# Patient Record
Sex: MALE | Race: WHITE | NOT HISPANIC OR LATINO | Employment: OTHER | URBAN - METROPOLITAN AREA
[De-identification: names, ages, dates, MRNs, and addresses within clinical notes are randomized per-mention and may not be internally consistent; named-entity substitution may affect disease eponyms.]

---

## 2017-01-10 ENCOUNTER — RECORDS - HEALTHEAST (OUTPATIENT)
Dept: LAB | Facility: CLINIC | Age: 82
End: 2017-01-10

## 2017-01-10 LAB
CHOLEST SERPL-MCNC: 192 MG/DL
FASTING STATUS PATIENT QL REPORTED: YES
HDLC SERPL-MCNC: 34 MG/DL
LDLC SERPL CALC-MCNC: 125 MG/DL
TRIGL SERPL-MCNC: 165 MG/DL

## 2018-02-02 ENCOUNTER — RECORDS - HEALTHEAST (OUTPATIENT)
Dept: LAB | Facility: CLINIC | Age: 83
End: 2018-02-02

## 2018-02-02 LAB
ALBUMIN SERPL-MCNC: 3.4 G/DL (ref 3.5–5)
ALP SERPL-CCNC: 53 U/L (ref 45–120)
ALT SERPL W P-5'-P-CCNC: 14 U/L (ref 0–45)
ANION GAP SERPL CALCULATED.3IONS-SCNC: 8 MMOL/L (ref 5–18)
AST SERPL W P-5'-P-CCNC: 21 U/L (ref 0–40)
BILIRUB SERPL-MCNC: 0.8 MG/DL (ref 0–1)
BUN SERPL-MCNC: 10 MG/DL (ref 8–28)
CALCIUM SERPL-MCNC: 8.7 MG/DL (ref 8.5–10.5)
CHLORIDE BLD-SCNC: 104 MMOL/L (ref 98–107)
CHOLEST SERPL-MCNC: 205 MG/DL
CO2 SERPL-SCNC: 25 MMOL/L (ref 22–31)
CREAT SERPL-MCNC: 0.79 MG/DL (ref 0.7–1.3)
FASTING STATUS PATIENT QL REPORTED: ABNORMAL
GFR SERPL CREATININE-BSD FRML MDRD: >60 ML/MIN/1.73M2
GLUCOSE BLD-MCNC: 82 MG/DL (ref 70–125)
HDLC SERPL-MCNC: 39 MG/DL
LDLC SERPL CALC-MCNC: 136 MG/DL
POTASSIUM BLD-SCNC: 4.5 MMOL/L (ref 3.5–5)
PROT SERPL-MCNC: 7 G/DL (ref 6–8)
SODIUM SERPL-SCNC: 137 MMOL/L (ref 136–145)
TRIGL SERPL-MCNC: 148 MG/DL
TSH SERPL DL<=0.005 MIU/L-ACNC: 3.15 UIU/ML (ref 0.3–5)

## 2018-02-21 ENCOUNTER — RECORDS - HEALTHEAST (OUTPATIENT)
Dept: LAB | Facility: CLINIC | Age: 83
End: 2018-02-21

## 2018-02-24 LAB
BACTERIA SPEC CULT: ABNORMAL
BACTERIA SPEC CULT: ABNORMAL

## 2018-12-27 ENCOUNTER — AMBULATORY - HEALTHEAST (OUTPATIENT)
Dept: ADMINISTRATIVE | Facility: REHABILITATION | Age: 83
End: 2018-12-27

## 2018-12-27 ENCOUNTER — RECORDS - HEALTHEAST (OUTPATIENT)
Dept: ADMINISTRATIVE | Facility: OTHER | Age: 83
End: 2018-12-27

## 2018-12-27 DIAGNOSIS — R41.3 MEMORY LOSS: ICD-10-CM

## 2019-01-02 ENCOUNTER — RECORDS - HEALTHEAST (OUTPATIENT)
Dept: LAB | Facility: HOSPITAL | Age: 84
End: 2019-01-02

## 2019-01-02 LAB
TSH SERPL DL<=0.005 MIU/L-ACNC: 2.19 UIU/ML (ref 0.3–5)
VIT B12 SERPL-MCNC: 729 PG/ML (ref 213–816)

## 2019-01-03 ENCOUNTER — HOSPITAL ENCOUNTER (OUTPATIENT)
Dept: MRI IMAGING | Facility: HOSPITAL | Age: 84
Discharge: HOME | End: 2019-01-03

## 2019-01-03 DIAGNOSIS — R41.3 MEMORY LOSS: ICD-10-CM

## 2019-01-03 LAB
CREAT BLD-MCNC: 0.9 MG/DL
CREAT BLD-MCNC: 0.9 MG/DL (ref 0.7–1.3)
POC GFR AMER AF HE - HISTORICAL: >60 ML/MIN/1.73M2
POC GFR NON AMER AF HE - HISTORICAL: >60 ML/MIN/1.73M2

## 2019-02-01 ENCOUNTER — RECORDS - HEALTHEAST (OUTPATIENT)
Dept: LAB | Facility: CLINIC | Age: 84
End: 2019-02-01

## 2019-02-01 LAB
ALBUMIN SERPL-MCNC: 3.5 G/DL (ref 3.5–5)
ALP SERPL-CCNC: 52 U/L (ref 45–120)
ALT SERPL W P-5'-P-CCNC: 15 U/L (ref 0–45)
ANION GAP SERPL CALCULATED.3IONS-SCNC: 11 MMOL/L (ref 5–18)
AST SERPL W P-5'-P-CCNC: 19 U/L (ref 0–40)
BILIRUB SERPL-MCNC: 0.8 MG/DL (ref 0–1)
BUN SERPL-MCNC: 9 MG/DL (ref 8–28)
CALCIUM SERPL-MCNC: 8.9 MG/DL (ref 8.5–10.5)
CHLORIDE BLD-SCNC: 103 MMOL/L (ref 98–107)
CHOLEST SERPL-MCNC: 203 MG/DL
CO2 SERPL-SCNC: 24 MMOL/L (ref 22–31)
CREAT SERPL-MCNC: 0.88 MG/DL (ref 0.7–1.3)
FASTING STATUS PATIENT QL REPORTED: YES
GFR SERPL CREATININE-BSD FRML MDRD: >60 ML/MIN/1.73M2
GLUCOSE BLD-MCNC: 83 MG/DL (ref 70–125)
HDLC SERPL-MCNC: 46 MG/DL
LDLC SERPL CALC-MCNC: 136 MG/DL
POTASSIUM BLD-SCNC: 4.1 MMOL/L (ref 3.5–5)
PROT SERPL-MCNC: 6.8 G/DL (ref 6–8)
SODIUM SERPL-SCNC: 138 MMOL/L (ref 136–145)
TRIGL SERPL-MCNC: 106 MG/DL
TSH SERPL DL<=0.005 MIU/L-ACNC: 2.72 UIU/ML (ref 0.3–5)

## 2019-04-02 ENCOUNTER — RECORDS - HEALTHEAST (OUTPATIENT)
Dept: LAB | Facility: CLINIC | Age: 84
End: 2019-04-02

## 2019-04-02 LAB
BASOPHILS # BLD AUTO: 0 THOU/UL (ref 0–0.2)
BASOPHILS NFR BLD AUTO: 1 % (ref 0–2)
CRP SERPL HS-MCNC: 8.3 MG/L (ref 0–3)
EOSINOPHIL # BLD AUTO: 0.3 THOU/UL (ref 0–0.4)
EOSINOPHIL NFR BLD AUTO: 5 % (ref 0–6)
ERYTHROCYTE [DISTWIDTH] IN BLOOD BY AUTOMATED COUNT: 13.7 % (ref 11–14.5)
ERYTHROCYTE [SEDIMENTATION RATE] IN BLOOD BY WESTERGREN METHOD: 16 MM/HR (ref 0–15)
HCT VFR BLD AUTO: 44.1 % (ref 40–54)
HGB BLD-MCNC: 14.5 G/DL (ref 14–18)
LYMPHOCYTES # BLD AUTO: 1.8 THOU/UL (ref 0.8–4.4)
LYMPHOCYTES NFR BLD AUTO: 26 % (ref 20–40)
MCH RBC QN AUTO: 30.5 PG (ref 27–34)
MCHC RBC AUTO-ENTMCNC: 32.9 G/DL (ref 32–36)
MCV RBC AUTO: 93 FL (ref 80–100)
MONOCYTES # BLD AUTO: 0.9 THOU/UL (ref 0–0.9)
MONOCYTES NFR BLD AUTO: 13 % (ref 2–10)
NEUTROPHILS # BLD AUTO: 3.9 THOU/UL (ref 2–7.7)
NEUTROPHILS NFR BLD AUTO: 56 % (ref 50–70)
PLATELET # BLD AUTO: 278 THOU/UL (ref 140–440)
PMV BLD AUTO: 9 FL (ref 8.5–12.5)
RBC # BLD AUTO: 4.76 MILL/UL (ref 4.4–6.2)
WBC: 7 THOU/UL (ref 4–11)

## 2019-08-15 ENCOUNTER — RECORDS - HEALTHEAST (OUTPATIENT)
Dept: LAB | Facility: CLINIC | Age: 84
End: 2019-08-15

## 2019-08-15 LAB
O+P STL MICRO: NORMAL
SHIGA TOXIN 1: NEGATIVE
SHIGA TOXIN 2: NEGATIVE

## 2019-08-17 LAB — BACTERIA SPEC CULT: NORMAL

## 2019-10-18 ENCOUNTER — OFFICE VISIT - HEALTHEAST (OUTPATIENT)
Dept: GERIATRICS | Facility: CLINIC | Age: 84
End: 2019-10-18

## 2019-10-18 DIAGNOSIS — I10 HYPERTENSION, UNSPECIFIED TYPE: ICD-10-CM

## 2019-10-18 DIAGNOSIS — M35.3 PMR (POLYMYALGIA RHEUMATICA) (H): ICD-10-CM

## 2019-10-18 DIAGNOSIS — J94.2 HEMOTHORAX: ICD-10-CM

## 2019-10-18 DIAGNOSIS — S22.41XD CLOSED FRACTURE OF MULTIPLE RIBS OF RIGHT SIDE WITH ROUTINE HEALING, SUBSEQUENT ENCOUNTER: ICD-10-CM

## 2019-10-21 ENCOUNTER — RECORDS - HEALTHEAST (OUTPATIENT)
Dept: LAB | Facility: CLINIC | Age: 84
End: 2019-10-21

## 2019-10-21 LAB
BASOPHILS # BLD AUTO: 0.1 THOU/UL (ref 0–0.2)
BASOPHILS NFR BLD AUTO: 1 % (ref 0–2)
EOSINOPHIL # BLD AUTO: 0.5 THOU/UL (ref 0–0.4)
EOSINOPHIL NFR BLD AUTO: 5 % (ref 0–6)
ERYTHROCYTE [DISTWIDTH] IN BLOOD BY AUTOMATED COUNT: 14.6 % (ref 11–14.5)
HCT VFR BLD AUTO: 37.6 % (ref 40–54)
HGB BLD-MCNC: 12.2 G/DL (ref 14–18)
LYMPHOCYTES # BLD AUTO: 2.1 THOU/UL (ref 0.8–4.4)
LYMPHOCYTES NFR BLD AUTO: 22 % (ref 20–40)
MCH RBC QN AUTO: 30.3 PG (ref 27–34)
MCHC RBC AUTO-ENTMCNC: 32.4 G/DL (ref 32–36)
MCV RBC AUTO: 94 FL (ref 80–100)
MONOCYTES # BLD AUTO: 0.9 THOU/UL (ref 0–0.9)
MONOCYTES NFR BLD AUTO: 9 % (ref 2–10)
NEUTROPHILS # BLD AUTO: 6 THOU/UL (ref 2–7.7)
NEUTROPHILS NFR BLD AUTO: 62 % (ref 50–70)
PLATELET # BLD AUTO: 331 THOU/UL (ref 140–440)
PMV BLD AUTO: 9.5 FL (ref 8.5–12.5)
RBC # BLD AUTO: 4.02 MILL/UL (ref 4.4–6.2)
WBC: 9.6 THOU/UL (ref 4–11)

## 2019-10-22 ENCOUNTER — OFFICE VISIT - HEALTHEAST (OUTPATIENT)
Dept: GERIATRICS | Facility: CLINIC | Age: 84
End: 2019-10-22

## 2019-10-22 DIAGNOSIS — F03.90 DEMENTIA WITHOUT BEHAVIORAL DISTURBANCE, UNSPECIFIED DEMENTIA TYPE: ICD-10-CM

## 2019-10-22 DIAGNOSIS — M35.3 PMR (POLYMYALGIA RHEUMATICA) (H): ICD-10-CM

## 2019-10-22 DIAGNOSIS — J94.2 HEMOTHORAX: ICD-10-CM

## 2019-10-22 DIAGNOSIS — R78.81 BACTEREMIA DUE TO ENTEROCOCCUS: ICD-10-CM

## 2019-10-22 DIAGNOSIS — S22.41XD CLOSED FRACTURE OF MULTIPLE RIBS OF RIGHT SIDE WITH ROUTINE HEALING, SUBSEQUENT ENCOUNTER: ICD-10-CM

## 2019-10-22 DIAGNOSIS — I10 HYPERTENSION, UNSPECIFIED TYPE: ICD-10-CM

## 2019-10-22 DIAGNOSIS — B95.2 BACTEREMIA DUE TO ENTEROCOCCUS: ICD-10-CM

## 2019-10-22 DIAGNOSIS — I10 ESSENTIAL HYPERTENSION: ICD-10-CM

## 2019-10-23 ENCOUNTER — AMBULATORY - HEALTHEAST (OUTPATIENT)
Dept: OTHER | Facility: CLINIC | Age: 84
End: 2019-10-23

## 2019-10-23 ENCOUNTER — RECORDS - HEALTHEAST (OUTPATIENT)
Dept: LAB | Facility: CLINIC | Age: 84
End: 2019-10-23

## 2019-10-23 LAB
ANION GAP SERPL CALCULATED.3IONS-SCNC: 7 MMOL/L (ref 5–18)
BUN SERPL-MCNC: 15 MG/DL (ref 8–28)
CALCIUM SERPL-MCNC: 8.9 MG/DL (ref 8.5–10.5)
CHLORIDE BLD-SCNC: 101 MMOL/L (ref 98–107)
CO2 SERPL-SCNC: 27 MMOL/L (ref 22–31)
CREAT SERPL-MCNC: 0.84 MG/DL (ref 0.7–1.3)
ERYTHROCYTE [DISTWIDTH] IN BLOOD BY AUTOMATED COUNT: 14.6 % (ref 11–14.5)
GFR SERPL CREATININE-BSD FRML MDRD: >60 ML/MIN/1.73M2
GLUCOSE BLD-MCNC: 76 MG/DL (ref 70–125)
HCT VFR BLD AUTO: 40.8 % (ref 40–54)
HGB BLD-MCNC: 13.1 G/DL (ref 14–18)
MAGNESIUM SERPL-MCNC: 2.1 MG/DL (ref 1.8–2.6)
MCH RBC QN AUTO: 30.5 PG (ref 27–34)
MCHC RBC AUTO-ENTMCNC: 32.1 G/DL (ref 32–36)
MCV RBC AUTO: 95 FL (ref 80–100)
PLATELET # BLD AUTO: 332 THOU/UL (ref 140–440)
PMV BLD AUTO: 9.6 FL (ref 8.5–12.5)
POTASSIUM BLD-SCNC: 4.2 MMOL/L (ref 3.5–5)
PREALB SERPL-MCNC: 24.9 MG/DL (ref 19–38)
RBC # BLD AUTO: 4.29 MILL/UL (ref 4.4–6.2)
SODIUM SERPL-SCNC: 135 MMOL/L (ref 136–145)
WBC: 8.6 THOU/UL (ref 4–11)

## 2019-10-27 ENCOUNTER — OFFICE VISIT - HEALTHEAST (OUTPATIENT)
Dept: GERIATRICS | Facility: CLINIC | Age: 84
End: 2019-10-27

## 2019-10-27 DIAGNOSIS — R52 PAIN: ICD-10-CM

## 2019-10-29 ENCOUNTER — OFFICE VISIT - HEALTHEAST (OUTPATIENT)
Dept: GERIATRICS | Facility: CLINIC | Age: 84
End: 2019-10-29

## 2019-10-29 DIAGNOSIS — I10 ESSENTIAL HYPERTENSION: ICD-10-CM

## 2019-10-29 DIAGNOSIS — S22.41XD CLOSED FRACTURE OF MULTIPLE RIBS OF RIGHT SIDE WITH ROUTINE HEALING, SUBSEQUENT ENCOUNTER: ICD-10-CM

## 2019-10-29 DIAGNOSIS — M35.3 PMR (POLYMYALGIA RHEUMATICA) (H): ICD-10-CM

## 2019-10-29 DIAGNOSIS — R78.81 BACTEREMIA DUE TO ENTEROCOCCUS: ICD-10-CM

## 2019-10-29 DIAGNOSIS — F03.90 DEMENTIA WITHOUT BEHAVIORAL DISTURBANCE, UNSPECIFIED DEMENTIA TYPE: ICD-10-CM

## 2019-10-29 DIAGNOSIS — J94.2 HEMOTHORAX: ICD-10-CM

## 2019-10-29 DIAGNOSIS — B95.2 BACTEREMIA DUE TO ENTEROCOCCUS: ICD-10-CM

## 2019-10-30 ENCOUNTER — AMBULATORY - HEALTHEAST (OUTPATIENT)
Dept: GERIATRICS | Facility: CLINIC | Age: 84
End: 2019-10-30

## 2019-11-11 ENCOUNTER — RECORDS - HEALTHEAST (OUTPATIENT)
Dept: LAB | Facility: CLINIC | Age: 84
End: 2019-11-11

## 2019-11-12 LAB — BACTERIA SPEC CULT: NO GROWTH

## 2020-09-21 DIAGNOSIS — R41.3 MEMORY DIFFICULTIES: Primary | ICD-10-CM

## 2020-09-21 RX ORDER — DONEPEZIL HYDROCHLORIDE 5 MG/1
5 TABLET, FILM COATED ORAL AT BEDTIME
Qty: 30 TABLET | Refills: 1 | Status: SHIPPED | OUTPATIENT
Start: 2020-09-21 | End: 2020-10-06

## 2020-09-21 RX ORDER — DONEPEZIL HYDROCHLORIDE 5 MG/1
1 TABLET, FILM COATED ORAL AT BEDTIME
COMMUNITY
Start: 2020-08-24 | End: 2020-09-21

## 2020-09-21 NOTE — TELEPHONE ENCOUNTER
Patient was last seen 2-.  Below is that note copied and pasted from [facility] chart:    Chief Complaint   Follow up after tests- History of Present Illness I saw Mr. Gordon in the office today in follow-up after his obtaining MRI testing. We reviewed his MRI scan which showed no evidence of hemorrhage or infarct or mass lesion. There were age-related changes noted, but also a disproportionate atrophy of the mesial temporal structures, which could be seen with an Alzheimer's type dementia. We had discussed doing OT evaluation of his activities of daily living, but he has not gotten that accomplished. He reports no difficulties with doing tasks around the house. He has not had any hallucinations. There has been no headache problems. No vision changes. No numbness or numbness problems. Laboratory studies had shown his B12 level to be normal at 729 and TSH was normal at 2.1. Review of Systems See above. Physical Exam Vitals & Measurements HR: 66(Peripheral) BP: 100/59 HT: 64 in WT: 168 lb BMI: 28.83 Alert oriented x3 speech is fluent. Extra ocular movements full. Visual fields full. Face symmetric. Tongue midline. Motor strength was 5/5. Gait normal based.    IMPRESSION: Memory difficulties. His laboratory studies for treatable causes came back as normal. The concerning issues are he is 0/5 delayed recall on the Titus study and the findings noted on MRI, with preferential atrophy of the mesial temporal structures. As such I think trying donepezil 5 mg a day, would be reasonable to try to slow down progression. We discussed this medication can be nausea problems and GI upset. We also discussed the can be a slowing of the heart rate that can be seen. We will see about still trying to get the activities of daily living assessment done. We will otherwise plan on follow-up in 2 months time.  Time of patient: 15 minutes, 10 minutes which was counseling/coordination of care.       Medication T'd for review and  signature  Brina Fleming on 9/21/2020 at 8:58 AM

## 2020-10-06 DIAGNOSIS — R41.3 MEMORY DIFFICULTIES: ICD-10-CM

## 2020-10-06 RX ORDER — DONEPEZIL HYDROCHLORIDE 5 MG/1
5 TABLET, FILM COATED ORAL AT BEDTIME
Qty: 90 TABLET | Refills: 0 | Status: SHIPPED | OUTPATIENT
Start: 2020-10-06 | End: 2020-11-13

## 2020-10-06 NOTE — TELEPHONE ENCOUNTER
I called number listed which is home number to patient's son Celestine.  I spoke with Celestine's wife who gave me cell number of [phone].  I left message at that number asking Don to call and schedule appointment for patient whose last visit was 2-.  Donepezil refill was sent to pharmacy September 21 for 30 day supply.  [facility] asking [facility] day supply [facility]ill. Is it ok to send refill for this amount or continue with 30 day supply until seen? I have pended prescription for your review but have left quantity blank.  Medication T'd for review and signature  Brina Vallejo CMA on 10/6/2020 at 8:47 AM

## 2020-11-13 ENCOUNTER — VIRTUAL VISIT (OUTPATIENT)
Dept: NEUROLOGY | Facility: CLINIC | Age: 85
End: 2020-11-13
Payer: COMMERCIAL

## 2020-11-13 VITALS — WEIGHT: 154 LBS | HEIGHT: 65 IN | BODY MASS INDEX: 25.66 KG/M2

## 2020-11-13 DIAGNOSIS — R41.3 MEMORY DIFFICULTIES: Primary | ICD-10-CM

## 2020-11-13 PROBLEM — F03.90 DEMENTIA WITHOUT BEHAVIORAL DISTURBANCE (H): Status: ACTIVE | Noted: 2020-11-13

## 2020-11-13 PROCEDURE — 99213 OFFICE O/P EST LOW 20 MIN: CPT | Mod: 95 | Performed by: PSYCHIATRY & NEUROLOGY

## 2020-11-13 RX ORDER — FINASTERIDE 5 MG/1
1 TABLET, FILM COATED ORAL DAILY
COMMUNITY
Start: 2020-09-10

## 2020-11-13 RX ORDER — PHENOL 1.4 %
10 AEROSOL, SPRAY (ML) MUCOUS MEMBRANE AT BEDTIME
COMMUNITY

## 2020-11-13 RX ORDER — IBUPROFEN 200 MG
200 TABLET ORAL EVERY EVENING
COMMUNITY

## 2020-11-13 RX ORDER — LEVOTHYROXINE SODIUM 100 UG/1
1 TABLET ORAL DAILY
COMMUNITY
Start: 2018-12-27

## 2020-11-13 RX ORDER — DONEPEZIL HYDROCHLORIDE 5 MG/1
5 TABLET, FILM COATED ORAL AT BEDTIME
Qty: 90 TABLET | Refills: 3 | Status: SHIPPED | OUTPATIENT
Start: 2020-11-13 | End: 2022-01-24

## 2020-11-13 RX ORDER — DOXAZOSIN 8 MG/1
1 TABLET ORAL DAILY
COMMUNITY
Start: 2018-12-27

## 2020-11-13 ASSESSMENT — MIFFLIN-ST. JEOR: SCORE: 1282.48

## 2020-11-13 NOTE — LETTER
11/13/2020         RE: Obdulio Gordon  [address]  [address]  [place] [address]        Dear Colleague,    Thank you for referring your patient, Obdulio Gordon, to the [facility] [place]. Please see a copy of my visit note below.        Obdulio Gordon  89 year old  MRN:[id number]  PCP: Christian Kemp  No ref. provider found    Allergies   Allergen Reactions     Sulfacetamide Unknown       Current Outpatient Medications   Medication Sig Dispense Refill     aspirin (ASA) 81 MG EC tablet Take 81 mg by mouth daily       donepezil (ARICEPT) 5 MG tablet Take 1 tablet (5 mg) by mouth At Bedtime 90 tablet 0     doxazosin (CARDURA) 8 MG tablet Take 1 tablet by mouth daily       finasteride (PROSCAR) 5 MG tablet Take 1 tablet by mouth daily       ibuprofen (ADVIL/MOTRIN) 200 MG tablet Take 200 mg by mouth every evening       levothyroxine (SYNTHROID/LEVOTHROID) 100 MCG tablet Take 1 tablet by mouth daily       Melatonin 10 MG TABS tablet Take 10 mg by mouth At Bedtime       MULTIPLE VITAMIN PO Take by mouth daily       sertraline (ZOLOFT) 50 MG tablet Take 1 tablet by mouth daily         CHIEF COMPLAINT: Memory difficulties    HISTORY SINCE LAST VISIT: A telephone encounter was done today in lieu of office visit due to the COVID-19 pandemic.  Patient was agreeable to this type of encounter.  I talked with Mr. Gordon in neurologic follow-up.  I had last seen him on 2/4/2019 for concerns of memory difficulty.  He had had a Rhea score of 23/30 when seen at the end of December 2018, though delayed recall was 0/5.  MRI of the brain had showed atrophy, that was disproportionate to the mesial temporal structures.  B12 level and TSH had been normal.  Patient has been on donepezil 5 mg daily.  He reports living by himself.  He does get meals delivered to him that can be microwaved.  He has no hallucinations.  No headache.  There has been some decrease of vision which may represent macular  degeneration.  There is been no speech or swallowing problems.  He is able to dress himself and get cleaned up.  He reports in general he feels weaker but not in a focal manner.  His son helps with the paying of bills.  He reports no new medical problems.      PAST MEDICAL HISTORY:   History reviewed. No pertinent past medical history.  There is no problem list on file for this patient.    History reviewed. No pertinent surgical history.    FAMILY HISTORY:  Family History   Problem Relation Age of Onset     Arthritis Mother        SOCIAL HISTORY:  Social History     Socioeconomic History     Marital status:      Spouse name: Not on file     Number of children: Not on file     Years of education: Not on file     Highest education level: Not on file   Occupational History     Not on file   Social Needs     Financial resource strain: Not on file     Food insecurity     Worry: Not on file     Inability: Not on file     Transportation needs     Medical: Not on file     Non-medical: Not on file   Tobacco Use     Smoking status: Former Smoker     Smokeless tobacco: Never Used     Tobacco comment: Quit many years ago.   Substance and Sexual Activity     Alcohol use: Yes     Comment: 1-2 small shots every evening.     Drug use: Never     Sexual activity: Not on file   Lifestyle     Physical activity     Days per week: Not on file     Minutes per session: Not on file     Stress: Not on file   Relationships     Social connections     Talks on phone: Not on file     Gets together: Not on file     Attends Muslim service: Not on file     Active member of club or organization: Not on file     Attends meetings of clubs or organizations: Not on file     Relationship status: Not on file     Intimate partner violence     Fear of current or ex partner: Not on file     Emotionally abused: Not on file     Physically abused: Not on file     Forced sexual activity: Not on file   Other Topics Concern     Parent/sibling w/ CABG, MI  or angioplasty before 65F 55M? Not Asked   Social History Narrative     Not on file       REVIEW OF SYSTEMS:    Constitutional: No fever or chills.  Positive weight loss.  Eyes: Reports hearing difficulties.  Ears/Nose/Throat: Positive hearing loss.   Gastrointestinal: Rare diarrhea problems.   Genitourinary: Positive urinary frequency.    Neurologic: See above.  Psychiatric: Occasionally will feel depressed.      PHYSICAL EXAMINATION: Unable-telephone encounter        NEUROLOGIC EXAMINATION:    Oriented to himself.  He thought the month was March and nearly got the date saying it was the 12th instead of the 13th.  He could tell me the year and the day of the week.  He could tell me where he was at.  He could tell me the name of the president.  He could not tell me the name of the governor.  He could spell world forward and backwards.  He register 3/3 objects.  Remembered 0/3 objects at 5 minutes time.        IMPRESSION: Memory loss/dementia.  Patient reports his functioning is doing about the same as 1-1/2 years ago.  He continues on donepezil at 5 mg a day.  I refilled this.  1 possible option would be is to try to increase his donepezil to 10 mg daily to try and prevent or slow down progression of further memory problems.,  But he has had some occasional diarrhea problems.  I therefore refilled his donepezil at 5 mg daily.  Would recommend follow-up in person in a 6-12-month period of some time such that a more complete examination could be done of his memory.  Start of telephone encounter: 2:02 PM  End of telephone encounter: 2:16 PM          Sunil Mayers MD      Again, thank you for allowing me to participate in the care of your patient.        Sincerely,        Sunil Mayers MD, MD

## 2020-11-13 NOTE — NURSING NOTE
Phone visit at [phone]  Chief Complaint   Patient presents with     Memory difficulties     Follow up.   Brina Fleming RN on 11/13/2020 at 1:44 PM

## 2020-11-13 NOTE — PROGRESS NOTES
Obdulio Gordon  89 year old  MRN:[id number]  PCP: Christian Kemp  No ref. provider found    Allergies   Allergen Reactions     Sulfacetamide Unknown       Current Outpatient Medications   Medication Sig Dispense Refill     aspirin (ASA) 81 MG EC tablet Take 81 mg by mouth daily       donepezil (ARICEPT) 5 MG tablet Take 1 tablet (5 mg) by mouth At Bedtime 90 tablet 0     doxazosin (CARDURA) 8 MG tablet Take 1 tablet by mouth daily       finasteride (PROSCAR) 5 MG tablet Take 1 tablet by mouth daily       ibuprofen (ADVIL/MOTRIN) 200 MG tablet Take 200 mg by mouth every evening       levothyroxine (SYNTHROID/LEVOTHROID) 100 MCG tablet Take 1 tablet by mouth daily       Melatonin 10 MG TABS tablet Take 10 mg by mouth At Bedtime       MULTIPLE VITAMIN PO Take by mouth daily       sertraline (ZOLOFT) 50 MG tablet Take 1 tablet by mouth daily         CHIEF COMPLAINT: Memory difficulties    HISTORY SINCE LAST VISIT: A telephone encounter was done today in lieu of office visit due to the COVID-19 pandemic.  Patient was agreeable to this type of encounter.  I talked with Mr. Gordon in neurologic follow-up.  I had last seen him on 2/4/2019 for concerns of memory difficulty.  He had had a Claiborne score of 23/30 when seen at the end of December 2018, though delayed recall was 0/5.  MRI of the brain had showed atrophy, that was disproportionate to the mesial temporal structures.  B12 level and TSH had been normal.  Patient has been on donepezil 5 mg daily.  He reports living by himself.  He does get meals delivered to him that can be microwaved.  He has no hallucinations.  No headache.  There has been some decrease of vision which may represent macular degeneration.  There is been no speech or swallowing problems.  He is able to dress himself and get cleaned up.  He reports in general he feels weaker but not in a focal manner.  His son helps with the paying of bills.  He reports no new medical problems.      PAST MEDICAL  HISTORY:   History reviewed. No pertinent past medical history.  There is no problem list on file for this patient.    History reviewed. No pertinent surgical history.    FAMILY HISTORY:  Family History   Problem Relation Age of Onset     Arthritis Mother        SOCIAL HISTORY:  Social History     Socioeconomic History     Marital status:      Spouse name: Not on file     Number of children: Not on file     Years of education: Not on file     Highest education level: Not on file   Occupational History     Not on file   Social Needs     Financial resource strain: Not on file     Food insecurity     Worry: Not on file     Inability: Not on file     Transportation needs     Medical: Not on file     Non-medical: Not on file   Tobacco Use     Smoking status: Former Smoker     Smokeless tobacco: Never Used     Tobacco comment: Quit many years ago.   Substance and Sexual Activity     Alcohol use: Yes     Comment: 1-2 small shots every evening.     Drug use: Never     Sexual activity: Not on file   Lifestyle     Physical activity     Days per week: Not on file     Minutes per session: Not on file     Stress: Not on file   Relationships     Social connections     Talks on phone: Not on file     Gets together: Not on file     Attends [employer] service: Not on file     Active member of club or organization: Not on file     Attends meetings of clubs or organizations: Not on file     Relationship status: Not on file     Intimate partner violence     Fear of current or ex partner: Not on file     Emotionally abused: Not on file     Physically abused: Not on file     Forced sexual activity: Not on file   Other Topics Concern     Parent/sibling w/ CABG, MI or angioplasty before 65F 55M? Not Asked   Social History Narrative     Not on file       REVIEW OF SYSTEMS:    Constitutional: No fever or chills.  Positive weight loss.  Eyes: Reports hearing difficulties.  Ears/Nose/Throat: Positive hearing loss.   Gastrointestinal:  Rare diarrhea problems.   Genitourinary: Positive urinary frequency.    Neurologic: See above.  Psychiatric: Occasionally will feel depressed.      PHYSICAL EXAMINATION: Unable-telephone encounter        NEUROLOGIC EXAMINATION:    Oriented to himself.  He thought the month was March and nearly got the date saying it was the 12th instead of the 13th.  He could tell me the year and the day of the week.  He could tell me where he was at.  He could tell me the name of the president.  He could not tell me the name of the governor.  He could spell world forward and backwards.  He register 3/3 objects.  Remembered 0/3 objects at 5 minutes time.        IMPRESSION: Memory loss/dementia.  Patient reports his functioning is doing about the same as 1-1/2 years ago.  He continues on donepezil at 5 mg a day.  I refilled this.  1 possible option would be is to try to increase his donepezil to 10 mg daily to try and prevent or slow down progression of further memory problems.,  But he has had some occasional diarrhea problems.  I therefore refilled his donepezil at 5 mg daily.  Would recommend follow-up in person in a 6-12-month period of some time such that a more complete examination could be done of his memory.  Start of telephone encounter: 2:02 PM  End of telephone encounter: 2:16 PM          Sunil Mayers MD

## 2020-11-16 NOTE — PATIENT INSTRUCTIONS
I refilled your donepezil, with your taking 5 mg daily.  Would recommend follow-up in a 6-12-month period of time to further evaluate your memory.

## 2021-04-23 DIAGNOSIS — R41.3 MEMORY DIFFICULTIES: Primary | ICD-10-CM

## 2021-04-23 NOTE — TELEPHONE ENCOUNTER
Pt's son Luc called for a refill of Sertraline. [facility]  [place] and [facility] said we denied it. Call Don only if questions.

## 2021-04-23 NOTE — TELEPHONE ENCOUNTER
Patient's son is requesting refill of Sertraline that he says was denied.  I do not see Sertraline listed in your note but this is listed as patient reported medication.  When I look in  [facility] external pharmacy information I see Dr. Kemp's name attached to refill.  Is this a prescription you will authorize or would you like me to have son check with Dr. Kemp?  Medication T'd for review and signature  Brina Fleming RN on 4/23/2021 at 11:31 AM

## 2021-04-26 NOTE — TELEPHONE ENCOUNTER
Luc informed of Dr. Mayers's message and says when it is time for follow up he will verify where patient can be seen for appointment with his [employer] insurance.  Brina Fleming RN on 4/26/2021 at 9:08 AM

## 2021-05-26 VITALS
DIASTOLIC BLOOD PRESSURE: 71 MMHG | TEMPERATURE: 97.2 F | RESPIRATION RATE: 20 BRPM | SYSTOLIC BLOOD PRESSURE: 113 MMHG | OXYGEN SATURATION: 97 % | HEART RATE: 70 BPM

## 2021-05-30 ENCOUNTER — RECORDS - HEALTHEAST (OUTPATIENT)
Dept: ADMINISTRATIVE | Facility: CLINIC | Age: 86
End: 2021-05-30

## 2021-06-02 NOTE — PROGRESS NOTES
[facility]    Facility:   [facility] [[id number]]   Code Status: DNR      CHIEF COMPLAINT/REASON FOR VISIT:  Chief Complaint   Patient presents with     Problem Visit     asked to see       HISTORY:      HPI: Obdulio is a 88 y.o. male who I was asked to see secondary to most recent hospitalization October 12 through October 17, 2019 secondary to evaluation of a fall.  He was admitted with multiple right rib fractures basically 8 and 9.  The head CT was negative for any acute intracranial process.  Positive for right preorbital soft tissue injury but without facial bone or mandibular fracture.  He also did have an febrile illness of unclear etiology he was given empirically IV Zosyn and cultures were obtained.  The acute encephalopathy was more likely metabolic with a baseline of cognitive impairment.  Also further evaluation of his weakness and deconditioning.  The tachycardia was unclear more likely secondary to fever or pain.  His laboratory studies on October 12 did show white cells 11.4 hemoglobin 13.5 platelets 190.  Sodium 132 potassium 4.2 BUN 17 creatinine 0.91.  Albumin 3.0 unremarkable liver function test.  Troponins were negative.  CT of the head without contrast did not show any evidence of inner cranial process.  There are microvascular ischemic changes.  Multi-level cervical spondylosis.  The CT of the chest and abdomen and pelvis did show the fractures of right eighth and ninth posterior lateral ribs.  Small right hemothorax.  EKG normal.  He also did require a Cox catheter secondary to urinary retention which is now been removed.  His blood cultures did show enterococcus facialis and was treated for bacteremia with sepsis.  He currently is now in the transitional care unit to try to improve his strength and overall conditioning.  Had a pleasant conversation with him as well.  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  He also loves to deer hunt.   For pain he is currently on Tylenol thousand milligrams 3 times daily along with a lidocaine patch.  She is on Augmentin twice a day secondary to the bacteremia.  Otherwise has been normotensive and afebrile and also on room air.    No past medical history on file.          No family history on file.  Social History     Socioeconomic History     Marital status:      Spouse name: Not on file     Number of children: Not on file     Years of education: Not on file     Highest education level: Not on file   Occupational History     Not on file   Social Needs     Financial resource strain: Not on file     Food insecurity:     Worry: Not on file     Inability: Not on file     Transportation needs:     Medical: Not on file     Non-medical: Not on file   Tobacco Use     Smoking status: Former Smoker     Packs/day: 0.00     Smokeless tobacco: Never Used   Substance and Sexual Activity     Alcohol use: Yes     Comment: couple shots of gautam at night     Drug use: Never     Sexual activity: Not on file   Lifestyle     Physical activity:     Days per week: Not on file     Minutes per session: Not on file     Stress: Not on file   Relationships     Social connections:     Talks on phone: Not on file     Gets together: Not on file     Attends [employer] service: Not on file     Active member of club or organization: Not on file     Attends meetings of clubs or organizations: Not on file     Relationship status: Not on file     Intimate partner violence:     Fear of current or ex partner: Not on file     Emotionally abused: Not on file     Physically abused: Not on file     Forced sexual activity: Not on file   Other Topics Concern     Not on file   Social History Narrative     Not on file         Review of Systems  Currently denies chills and fever coughing wheezing chest pain dizziness or vertigo nausea vomiting diarrhea dysuria flulike symptoms headache stiff neck or swollen glands.  He does have a history of  hypertension PMR BPH hypothyroidism GERD and recent hospitalization secondary to a fall    Current Outpatient Medications:      acetaminophen (TYLENOL) 500 MG tablet, Take 2 tablets (1,000 mg total) by mouth 3 (three) times a day., Disp: 90 tablet, Rfl: 1     amoxicillin-clavulanate (AUGMENTIN) 875-125 mg per tablet, Take 1 tablet by mouth 2 (two) times a day for 10 days., Disp: 20 tablet, Rfl: 0     aspirin 81 MG EC tablet, Take 81 mg by mouth daily., Disp: , Rfl:      benzocaine-menthol (CEPACOL) 15-3.6 mg, Take 1 lozenge by mouth every 2 (two) hours as needed. For cough, Disp: 100 lozenge, Rfl: 0     cholecalciferol, vitamin D3, (CHOLECALCIFEROL) 400 unit Tab, Take 400 Units by mouth daily., Disp: , Rfl:      donepezil (ARICEPT) 5 MG tablet, TAKE 1 TABLET BY MOUTH EVERYDAY AT BEDTIME, Disp: , Rfl: 2     doxazosin (CARDURA) 8 MG tablet, Take 8 mg by mouth bedtime., Disp: , Rfl:      guaiFENesin ER (MUCINEX) 600 mg 12 hr tablet, Take 1 tablet (600 mg total) by mouth 2 (two) times a day., Disp: 60 tablet, Rfl: 1     levothyroxine (SYNTHROID, LEVOTHROID) 100 MCG tablet, TAKE 1 TABLET BY MOUTH ONCE A DAY FOR THYROID, Disp: , Rfl: 1     lidocaine 4 % patch, Place 1 patch on the skin daily. Remove and discard patch with 12 hours or as directed by MD., Disp: 30 patch, Rfl: 2     melatonin 3 mg Tab tablet, Take 1 tablet (3 mg total) by mouth at bedtime as needed., Disp: 30 tablet, Rfl: 1     multivitamin therapeutic (THERAGRAN) tablet, Take 1 tablet by mouth daily., Disp: , Rfl:      omeprazole (PRILOSEC) 20 MG capsule, Take 1 capsule (20 mg total) by mouth daily before breakfast., Disp: 30 capsule, Rfl: 1     polyethylene glycol (MIRALAX) 17 gram packet, Take 1 packet (17 g total) by mouth daily as needed. Hold for loose stools, Disp: 100 each, Rfl: 0     sertraline (ZOLOFT) 50 MG tablet, TAKE 1 TABLET BY MOUTH ONCE A DAY FOR MOOD, Disp: , Rfl: 1  No current facility-administered medications for this visit.     There  were no vitals filed for this visit.  Blood pressure 136/66 pulse 87 respirations 18 temperature 98.2 saturation room air 94%  Physical Exam  Constitutional Pleasant gentleman no acute distress.  Neck is supple without adenopathy.  Legs are clear throughout cardiovascular is normal without murmurs.  No lower extremity edema.  Belly soft and flat does have tenderness to the right lateral posterior ribs.  Musculoskeletal symmetrical movements.  Does admit to generalized weakness.  Psychiatric Pleasant affect.  LABS:   Lab Results   Component Value Date    WBC 11.7 (H) 10/17/2019    HGB 12.7 (L) 10/17/2019    HCT 38.8 (L) 10/17/2019    MCV 92 10/17/2019     10/17/2019     Results for orders placed or performed during the hospital encounter of 10/12/19   Basic Metabolic Panel   Result Value Ref Range    Sodium 138 136 - 145 mmol/L    Potassium 4.0 3.5 - 5.0 mmol/L    Chloride 107 98 - 107 mmol/L    CO2 26 22 - 31 mmol/L    Anion Gap, Calculation 5 5 - 18 mmol/L    Glucose 95 70 - 125 mg/dL    Calcium 8.2 (L) 8.5 - 10.5 mg/dL    BUN 9 8 - 28 mg/dL    Creatinine 0.81 0.70 - 1.30 mg/dL    GFR MDRD Af Amer >60 >60 mL/min/1.73m2    GFR MDRD Non Af Amer >60 >60 mL/min/1.73m2       Lab Results   Component Value Date    ALT 25 10/15/2019    AST 32 10/15/2019    ALKPHOS 54 10/15/2019    BILITOT 0.4 10/15/2019     Lab Results   Component Value Date    ALBUMIN 2.3 (L) 10/15/2019     Lab Results   Component Value Date    TSH 1.82 10/12/2019         ASSESSMENT:      ICD-10-CM    1. Hemothorax J94.2    2. Closed fracture of multiple ribs of right side with routine healing, subsequent encounter S22.41XD    3. PMR (polymyalgia rheumatica) (H) M35.3    4. Hypertension, unspecified type I10        PLAN:    I will repeat the CBC next week due to that he is on Augmentin and for the leukocytosis otherwise his BMP looks good his procalcitonin is really dropped now 0.85 and on the 15th it was 3.50 he is feeling much better.  He will  continue to work with therapy group.  We will also give him a probiotic to his due to being on the Augmentin.  He did not have any other questions.    For documentation purposes total visit 40 minutes which over 50% was spent with the patient going over his care his hospitalization medications laboratory studies rehabilitation and coordination of care.    .    Electronically signed by: Michael Duane Johnson, CNP

## 2021-06-02 NOTE — PROGRESS NOTES
Code Status:  DNR  Visit Type: Problem Visit     Facility:  [facility] [[id number]]             History of Present Illness: Obdulio Gordon is a 88 y.o. male who I am seeing today for follow up on the TCU. Pt recently hospitalized on 10/12/2019 secondary to fall.  Patient with past medical history of dementia, hypertension, polymyalgia rheumatica, BPH, hypothyroidism and GERD.  Patient found to have bacteremia with sepsis.  Etiology was unclear.  Possible  given recent hematuria her clinical records.  Blood culture did show growth of gram-positive cocci with culture showing Enterococcus faecalis growth.  UA was unremarkable for UTI.  Patient was treated with IV antibiotics and discharged home on a 10-day course of Augmentin.  Patient with right chest wall pain and cough post fall.  He was found to have small right hemothorax and nondisplaced fractures of the right eighth and ninth posterior lateral ribs.  He was also found a small amount of bilateral lower lung dependent atelectasis.  CT of the head, cervical spine and facial bones showed no acute intracranial process.  He did have brain atrophy and chronic microvascular ischemic changes.  There was a right periorbital soft tissue injury with facial bone or mandibular fracture.  Inflammatory changes of the sinus is noted.  CT of the cervical spine showed multilevel cervical spondylosis with minimal progression since 2008.  During hospitalization his cough did worsen and a chest x-ray showed worsened bibasilar infiltrates concerning for pneumonia.  His white count was elevated on the day prior to discharge at 14.7 the patient remained afebrile with normal vital signs.  He was started on guaifenesin tablets for cough.  A sputum culture was obtained.  Patient was treated with lidocaine patch and Tylenol for pain.  No surgical intervention was recommended.  No use of narcotics secondary to dementia.  Patient lives at home with his son.  He continues on  Aricept and Zoloft.  He has had recurrent falls.  History of urinary retention secondary to BPH.  He did have a Cox which was DC'd prior to discharge.  His finasteride was discontinued on day of discharge.      Today patient sitting up in bedside chair.  Nursing staff report ongoing diarrhea. Patient himself tells me he is having about 3 loose stools a day.  He does continue on Augmentin.  He is on Culturelle 1 capsule daily.  He is afebrile. He does continue with some right rib pain.  He denies any shortness of breath.  Cough is improving.  Nursing staff report patient is down about 10 pounds however patient tells me he normally weighs around  175.  Currently 185.  I do note lower extremity edema.  He also reports some swelling in his testicles which seems to be going down.  He has a history of urinary incontinence.           Active Ambulatory Problems     Diagnosis Date Noted     PMR (polymyalgia rheumatica) (H) 07/27/2015     Primary osteoarthritis of both hands 10/07/2015     Primary osteoarthritis of both shoulders 03/14/2016     Right carpal tunnel syndrome 06/08/2016     Traumatic hemothorax, initial encounter 10/12/2019     Hemothorax 10/12/2019     Resolved Ambulatory Problems     Diagnosis Date Noted     Myalgia 07/13/2015     Shoulder tendinitis, right 07/13/2015     No Additional Past Medical History       Current Outpatient Medications   Medication Sig     acetaminophen (TYLENOL) 500 MG tablet Take 2 tablets (1,000 mg total) by mouth 3 (three) times a day.     amoxicillin-clavulanate (AUGMENTIN) 875-125 mg per tablet Take 1 tablet by mouth 2 (two) times a day for 10 days.     aspirin 81 MG EC tablet Take 81 mg by mouth daily.     benzocaine-menthol (CEPACOL) 15-3.6 mg Take 1 lozenge by mouth every 2 (two) hours as needed. For cough     cholecalciferol, vitamin D3, (CHOLECALCIFEROL) 400 unit Tab Take 400 Units by mouth daily.     donepezil (ARICEPT) 5 MG tablet TAKE 1 TABLET BY MOUTH EVERYDAY AT  BEDTIME     doxazosin (CARDURA) 8 MG tablet Take 8 mg by mouth bedtime.     guaiFENesin ER (MUCINEX) 600 mg 12 hr tablet Take 1 tablet (600 mg total) by mouth 2 (two) times a day.     levothyroxine (SYNTHROID, LEVOTHROID) 100 MCG tablet TAKE 1 TABLET BY MOUTH ONCE A DAY FOR THYROID     lidocaine 4 % patch Place 1 patch on the skin daily. Remove and discard patch with 12 hours or as directed by MD.     melatonin 3 mg Tab tablet Take 1 tablet (3 mg total) by mouth at bedtime as needed.     multivitamin therapeutic (THERAGRAN) tablet Take 1 tablet by mouth daily.     omeprazole (PRILOSEC) 20 MG capsule Take 1 capsule (20 mg total) by mouth daily before breakfast.     polyethylene glycol (MIRALAX) 17 gram packet Take 1 packet (17 g total) by mouth daily as needed. Hold for loose stools     sertraline (ZOLOFT) 50 MG tablet TAKE 1 TABLET BY MOUTH ONCE A DAY FOR MOOD       Allergies   Allergen Reactions     Sulfa (Sulfonamide Antibiotics)        Review of Systems  No fevers or chills. No headache, lightheadedness or dizziness. No SOB, reports cough is improving, chest pains or palpitations. Appetite is fair.  Weight loss reported.  No nausea, vomiting, constipation. He is reporting 3 loose stools a day.  No dysuria, frequency, burning or pain with urination.  Urinary incontinence.  Otherwise review of systems are negative.     Physical Exam  PHYSICAL EXAMINATION:  Vital signs: /71, pulse 73, respirations 16, temperature 97.8, O2 sat 94% on room air.  Current weight 176.8 pounds.  General: Awake, Alert, oriented x3, appropriately, follows simple commands, conversant  HEENT:PERRLA, Pink conjunctiva, anicteric sclerae, moist oral mucosa  NECK: Supple, without any lymphadenopathy, or masses  CVS:  S1  S2, without murmur or gallop.   LUNG: Crackles in the left lower lobe.  No cough.  BACK: No kyphosis of the thoracic spine.  Some mild tenderness to the right rib cage.   ABDOMEN: Soft, nontender to palpation, with positive  bowel sounds  EXTREMITIES: Moves with generalized weakness both upper and lower extremities,1+pedal edema, no calf tenderness  SKIN: Warm and dry, no rashes or erythema noted  NEUROLOGIC: Intact, pulses palpable  PSYCHIATRIC:  mild cognitive impairment      Labs:    Lab Results   Component Value Date    WBC 9.6 10/21/2019    HGB 12.2 (L) 10/21/2019    HCT 37.6 (L) 10/21/2019    MCV 94 10/21/2019     10/21/2019     Results for orders placed or performed during the hospital encounter of 10/12/19   Basic Metabolic Panel   Result Value Ref Range    Sodium 138 136 - 145 mmol/L    Potassium 4.0 3.5 - 5.0 mmol/L    Chloride 107 98 - 107 mmol/L    CO2 26 22 - 31 mmol/L    Anion Gap, Calculation 5 5 - 18 mmol/L    Glucose 95 70 - 125 mg/dL    Calcium 8.2 (L) 8.5 - 10.5 mg/dL    BUN 9 8 - 28 mg/dL    Creatinine 0.81 0.70 - 1.30 mg/dL    GFR MDRD Af Amer >60 >60 mL/min/1.73m2    GFR MDRD Non Af Amer >60 >60 mL/min/1.73m2           Assessment/Plan:  1. Hemothorax  Denies shortness of breath or chest pain.   2. Closed fracture of multiple ribs of right side with routine healing, subsequent encounter   pain in his right rib cage controlled with lidocaine and Tylenol.   3. PMR (polymyalgia rheumatica) (H)   continue on Tylenol 3 times daily.  Continues on    4. Bacteremia due to Enterococcus   Augmentin twice daily until 10/26/2019. Patient now having loose stools x3 daily.    5. Hypertension, unspecified type   satisfactory controlled on no meds.   6. Dementia without behavioral disturbance, unspecified dementia type (H)   continues on Aricept.   7. Loose stools   Increase Culturelle to 1 capsule twice daily.  Obtain stool for C. difficile sample.  One stool culture obtained May use Imodium 2 mg 4 times daily as needed.  Follow-up with a CBC, BMP and magnesium in the a.m.    8.     Weight loss                                                               RD to consult for weight loss.  I do expect some weight loss  secondary to fluid overload.    Electronically signed by: Kary Osborn, BERT

## 2021-06-02 NOTE — PROGRESS NOTES
[facility]    Facility:   [facility] [place] [[id number]]   Code Status: FULL CODE        Admission History and Physical   Obdulio Gordon,  1931, MRN [id number]    PCP: Christian Kemp MD, [phone]    Date of Service   Code status:  [email or web address]@       Extended Emergency Contact Information  Primary Emergency Contact: Debi Gordon  Address: [address]           [place] [address] [facility]  Home Phone: [phone]  Relation: Spouse  Secondary Emergency Contact: Kell Resendiz  Address: [address] [place], [place] [address] [address] [facility]  Home Phone: [phone]  Work Phone: [phone]  Mobile Phone: [phone]  Relation: Child       Assessment and Plan             1. Recent  Hospitalization  at  [facility]      On 10/12/19     and discharged on 10/17/19. history of dementia, hypertension,  polymyalgia rheumatica BPH, hypothyroidism and GERD who presented for evaluation of fall.  He was admitted with multiple right rib fractures, head trauma, acute febrile illness, and acute encephalopathy, and was managed for bacteremia with sepsis and right chest wall pain management. The likely source for infection was . Blood cultures showed GPC - enterococcus faecalis. Patient was treated with iv antibiotic and switched to 10 days of oral augmentin.    2. Pain issues : on lidocaine patch    3. Dementia : on aricept    4. HTN, Essential : on doxazosin 8 mg daily    5. GERD : on ppi    6. Hypothyroidism : on levothyroxine    7. Anxiety/Depression : on zoloft 50 mg daily                        Disposition/Plan :            Patient is a  88 yr old  Male. He was recently hospitalized at  [facility]      On 10/12/19     and discharged on 10/17/19. history of dementia, hypertension,  polymyalgia rheumatica BPH, hypothyroidism and GERD who presented for evaluation of fall.  He was admitted with multiple right rib fractures, head  trauma, acute febrile illness, and acute encephalopathy, and was managed for bacteremia with sepsis and right chest wall pain management. The likely source for infection was . Blood cultures showed GPC - enterococcus faecalis. Patient was treated with iv antibiotic and switched to 10 days of oral augmentin.            Patient was [facility], pt and ot.  Patient is medically stable at this time.   He denies any significant complaints at this time. Pain is well controlled and patient seems stable from infection stand point.           Chief Complaint: pain     History of Present Illness         Patient is a  88 yr old  Male. He was recently hospitalized at  [facility]      On 10/12/19     and discharged on 10/17/19. history of dementia, hypertension,  polymyalgia rheumatica BPH, hypothyroidism and GERD who presented for evaluation of fall.  He was admitted with multiple right rib fractures, head trauma, acute febrile illness, and acute encephalopathy, and was managed for bacteremia with sepsis and right chest wall pain management. The likely source for infection was . Blood cultures showed GPC - enterococcus faecalis. Patient was treated with iv antibiotic and switched to 10 days of oral augmentin.            Patient was then [facility], pt and ot.  Patient is medically stable at this time.   He denies any significant complaints at this time. Pain is well controlled and patient seems stable from infection stand point.          No h/o fever or chills  No cardiorespiratory symptoms  No abdominal symptoms  No urinary symptoms  No neuro symptoms.       Medical History  Active Ambulatory (Non-Hospital) Problems    Diagnosis     Traumatic hemothorax, initial encounter     Hemothorax     Right carpal tunnel syndrome     Primary osteoarthritis of both shoulders     Primary osteoarthritis of both hands     PMR  (polymyalgia rheumatica) (H)     No past medical history on file.     Surgical History  He  has no past surgical history on file.       Social History  Reviewed, and he  reports that he has quit smoking. He smoked 0.00 packs per day. He has never used smokeless tobacco. He reports current alcohol use. He reports that he does not use drugs.     Allergies  Allergies   Allergen Reactions     Sulfa (Sulfonamide Antibiotics)     Family History  Reviewed, and non contributory        Prior to Admission Medications   (Not in a hospital admission)         Review of Systems:  A 12 point comprehensive review of systems was negative except as noted. Physical Exam:      HEENT : no distended veins, no lymphadenopathy, thyroid is normal  LUNGS : b/l air entry present, no significant crackles/wheezing.  ABDOMEN : soft, non-tender, non-distended, BS present.  HEART :  Regular rate & rhythm, S1 & S2 normal, no murmur, clicks/rubs, no ankle edema,  NEURO : conscious, oriented, responds to commands, no obvious focal deficit.  MUSCULOSKELETAL : EXTREMITIES/BACK :  no calf tenderness.  SKIN : no rashes      Vitals:    10/28/19 0704   BP: 113/71   Pulse: 70   Resp: 20   Temp: 97.2  F (36.2  C)   SpO2: 97%                    Pertinent Labs      Lab Results: personally reviewed.   Lab Results   Component Value Date     (L) 10/23/2019    K 4.2 10/23/2019     10/23/2019    CO2 27 10/23/2019    BUN 15 10/23/2019    CREATININE 0.84 10/23/2019    CALCIUM 8.9 10/23/2019       Pertinent Radiology      Radiology Results: Personally reviewed image/s  EKG Results:    Advanced Care Planning             Total Time Spent > 45 mins.  Discharge Planning discussed with Patient and Family  Discussed care with Patient for 35  Minutes and greater than 50% of total time spent in coordinating the plan of care.             Electronically signed by: Alex Holliday MD

## 2021-06-02 NOTE — PROGRESS NOTES
Code Status:  DNR  Visit Type: Discharge Summary     Facility:  [facility] [[id number]]             History of Present Illness: Obdulio Gordon is a 88 y.o. male who I am seeing today for discharge  the TCU. Pt recently hospitalized on 10/12/2019 secondary to fall.  Patient with past medical history of dementia, hypertension, polymyalgia rheumatica, BPH, hypothyroidism and GERD.  Patient found to have bacteremia with sepsis.  Etiology was unclear.  Possible  given recent hematuria her clinical records.  Blood culture did show growth of gram-positive cocci with culture showing Enterococcus faecalis growth.  UA was unremarkable for UTI.  Patient was treated with IV antibiotics and discharged home on a 10-day course of Augmentin.  Patient with right chest wall pain and cough post fall.  He was found to have small right hemothorax and nondisplaced fractures of the right eighth and ninth posterior lateral ribs.  He was also found a small amount of bilateral lower lung dependent atelectasis.  CT of the head, cervical spine and facial bones showed no acute intracranial process.  He did have brain atrophy and chronic microvascular ischemic changes.  There was a right periorbital soft tissue injury with facial bone or mandibular fracture.  Inflammatory changes of the sinus is noted.  CT of the cervical spine showed multilevel cervical spondylosis with minimal progression since 2008.  During hospitalization his cough did worsen and a chest x-ray showed worsened bibasilar infiltrates concerning for pneumonia.  His white count was elevated on the day prior to discharge at 14.7 the patient remained afebrile with normal vital signs.  He was started on guaifenesin tablets for cough.  A sputum culture was obtained.  Patient was treated with lidocaine patch and Tylenol for pain.  No surgical intervention was recommended.  No use of narcotics secondary to dementia.  Patient lives at home with his son.  He continues on  Aricept and Zoloft.  He has had recurrent falls.  History of urinary retention secondary to BPH.  He did have a Cox which was DC'd prior to discharge.  His finasteride was discontinued on day of discharge.      Today patient sitting up in bedside chair. Pt with mild cognitive impairment. He is able to make his needs known. He was driving prior to admit. We have recommended a driving eval due to cognition. He has had some ongoing diarrhea due to antibiotic use. He has completed his antibiotics. He continues on probiotic. He was given Imodium yesterday and no loose stools today. He has a history of urinary incontinence. He is afebrile.He does continue with some right rib pain.  He denies any shortness of breath.  Cough has resolved. Pain in his ribs controlled with lidocaine patches and tylenol. He has had some weight loss however edema has greatly improved.         Active Ambulatory Problems     Diagnosis Date Noted     PMR (polymyalgia rheumatica) (H) 07/27/2015     Primary osteoarthritis of both hands 10/07/2015     Primary osteoarthritis of both shoulders 03/14/2016     Right carpal tunnel syndrome 06/08/2016     Traumatic hemothorax, initial encounter 10/12/2019     Hemothorax 10/12/2019     Resolved Ambulatory Problems     Diagnosis Date Noted     Myalgia 07/13/2015     Shoulder tendinitis, right 07/13/2015     No Additional Past Medical History       Current Outpatient Medications   Medication Sig     acetaminophen (TYLENOL) 500 MG tablet Take 2 tablets (1,000 mg total) by mouth 3 (three) times a day.     aspirin 81 MG EC tablet Take 81 mg by mouth daily.     benzocaine-menthol (CEPACOL) 15-3.6 mg Take 1 lozenge by mouth every 2 (two) hours as needed. For cough     cholecalciferol, vitamin D3, (CHOLECALCIFEROL) 400 unit Tab Take 400 Units by mouth daily.     donepezil (ARICEPT) 5 MG tablet TAKE 1 TABLET BY MOUTH EVERYDAY AT BEDTIME     doxazosin (CARDURA) 8 MG tablet Take 8 mg by mouth bedtime.      guaiFENesin ER (MUCINEX) 600 mg 12 hr tablet Take 1 tablet (600 mg total) by mouth 2 (two) times a day.     levothyroxine (SYNTHROID, LEVOTHROID) 100 MCG tablet TAKE 1 TABLET BY MOUTH ONCE A DAY FOR THYROID     lidocaine 4 % patch Place 1 patch on the skin daily. Remove and discard patch with 12 hours or as directed by MD.     melatonin 3 mg Tab tablet Take 1 tablet (3 mg total) by mouth at bedtime as needed.     multivitamin therapeutic (THERAGRAN) tablet Take 1 tablet by mouth daily.     omeprazole (PRILOSEC) 20 MG capsule Take 1 capsule (20 mg total) by mouth daily before breakfast.     polyethylene glycol (MIRALAX) 17 gram packet Take 1 packet (17 g total) by mouth daily as needed. Hold for loose stools     sertraline (ZOLOFT) 50 MG tablet TAKE 1 TABLET BY MOUTH ONCE A DAY FOR MOOD       Allergies   Allergen Reactions     Sulfa (Sulfonamide Antibiotics)        Review of Systems  No fevers or chills. No headache, lightheadedness or dizziness. No SOB, reports cough is improving, chest pains or palpitations. Appetite is fair.  Weight loss reported.  No nausea, vomiting, constipation. He is reporting 3 loose stools a day.  No dysuria, frequency, burning or pain with urination.  Urinary incontinence.  Otherwise review of systems are negative.     Physical Exam  PHYSICAL EXAMINATION:  Vital signs: /71, pulse 73, respirations 16, temperature 97.8, O2 sat 94% on room air.  Current weight 176.8 pounds.  General: Awake, Alert, oriented x3, appropriately, follows simple commands, conversant  HEENT:PERRLA, Pink conjunctiva, anicteric sclerae, moist oral mucosa  NECK: Supple, without any lymphadenopathy, or masses  CVS:  S1  S2, without murmur or gallop.   LUNG: Crackles in the left lower lobe.  No cough.  BACK: No kyphosis of the thoracic spine.  Some mild tenderness to the right rib cage.   ABDOMEN: Soft, nontender to palpation, with positive bowel sounds  EXTREMITIES: Moves with generalized weakness both upper and  lower extremities,1+pedal edema, no calf tenderness  SKIN: Warm and dry, no rashes or erythema noted  NEUROLOGIC: Intact, pulses palpable  PSYCHIATRIC:  mild cognitive impairment      Labs:    Lab Results   Component Value Date    WBC 8.6 10/23/2019    HGB 13.1 (L) 10/23/2019    HCT 40.8 10/23/2019    MCV 95 10/23/2019     10/23/2019     Results for orders placed or performed in visit on 10/23/19   Basic Metabolic Panel   Result Value Ref Range    Sodium 135 (L) 136 - 145 mmol/L    Potassium 4.2 3.5 - 5.0 mmol/L    Chloride 101 98 - 107 mmol/L    CO2 27 22 - 31 mmol/L    Anion Gap, Calculation 7 5 - 18 mmol/L    Glucose 76 70 - 125 mg/dL    Calcium 8.9 8.5 - 10.5 mg/dL    BUN 15 8 - 28 mg/dL    Creatinine 0.84 0.70 - 1.30 mg/dL    GFR MDRD Af Amer >60 >60 mL/min/1.73m2    GFR MDRD Non Af Amer >60 >60 mL/min/1.73m2           Assessment/Plan:  1. Hemothorax  Denies shortness of breath or chest pain.   2. Closed fracture of multiple ribs of right side with routine healing, subsequent encounter   pain in his right rib cage controlled with lidocaine and Tylenol.   3. PMR (polymyalgia rheumatica) (H)   continue on Tylenol 3 times daily.    4. Bacteremia due to Enterococcus  He has completed his antibiotics.   5. Hypertension, unspecified type   satisfactory controlled on no meds.   6. Dementia without behavioral disturbance, unspecified dementia type (H)   continues on Aricept.   7. Loose stools  Continues on Culturelle  1 capsule twice daily. Stool sample negative for C-diff. He was given imodium yesterday and no loose stools today. Recent laboratory unremarkable.       8.     Weight loss                                                               I do expect some weight loss secondary to fluid overload.        Okay to d/c home with current meds and treatments. Home PT, OT, HHA and RN for medication management. Follow up with PCP in 1 week.     DISCHARGE PLAN/FACE TO FACE:  I certify that this patient is under  my care and that I, or a nurse practitioner or physician's assistant working with me, had a face-to-face encounter that meets the physician face-to-face encounter requirements with this patient.       I certify that, based on my findings, the following services are medically necessary home health services.    My clinical findings support the need for the above skilled services.    This patient is homebound because: recent fall with hemothorax and pneumonia.     The patient is, or has been, under my care and I have initiated the establishment of the plan of care. This patient will be followed by a physician who will periodically review the plan of care.      Electronically signed by: Kary Osborn CNP

## 2021-06-25 ENCOUNTER — RECORDS - HEALTHEAST (OUTPATIENT)
Dept: LAB | Facility: CLINIC | Age: 86
End: 2021-06-25

## 2021-06-25 LAB
ALBUMIN SERPL-MCNC: 3.5 G/DL (ref 3.5–5)
ALP SERPL-CCNC: 53 U/L (ref 45–120)
ALT SERPL W P-5'-P-CCNC: 30 U/L (ref 0–45)
ANION GAP SERPL CALCULATED.3IONS-SCNC: 7 MMOL/L (ref 5–18)
AST SERPL W P-5'-P-CCNC: 17 U/L (ref 0–40)
BILIRUB SERPL-MCNC: 0.5 MG/DL (ref 0–1)
BUN SERPL-MCNC: 11 MG/DL (ref 8–28)
CALCIUM SERPL-MCNC: 8.4 MG/DL (ref 8.5–10.5)
CHLORIDE BLD-SCNC: 102 MMOL/L (ref 98–107)
CHOLEST SERPL-MCNC: 215 MG/DL
CO2 SERPL-SCNC: 27 MMOL/L (ref 22–31)
CREAT SERPL-MCNC: 0.92 MG/DL (ref 0.7–1.3)
FASTING STATUS PATIENT QL REPORTED: ABNORMAL
GFR SERPL CREATININE-BSD FRML MDRD: >60 ML/MIN/1.73M2
GLUCOSE BLD-MCNC: 96 MG/DL (ref 70–125)
HDLC SERPL-MCNC: 36 MG/DL
LDLC SERPL CALC-MCNC: 156 MG/DL
POTASSIUM BLD-SCNC: 4.2 MMOL/L (ref 3.5–5)
PROT SERPL-MCNC: 6.5 G/DL (ref 6–8)
SODIUM SERPL-SCNC: 136 MMOL/L (ref 136–145)
TRIGL SERPL-MCNC: 114 MG/DL
TSH SERPL DL<=0.005 MIU/L-ACNC: 9.06 UIU/ML (ref 0.3–5)

## 2022-01-24 DIAGNOSIS — R41.3 MEMORY DIFFICULTIES: ICD-10-CM

## 2022-01-24 RX ORDER — DONEPEZIL HYDROCHLORIDE 5 MG/1
5 TABLET, FILM COATED ORAL AT BEDTIME
Qty: 30 TABLET | Refills: 0 | Status: SHIPPED | OUTPATIENT
Start: 2022-01-24 | End: 2022-02-21

## 2022-01-24 NOTE — TELEPHONE ENCOUNTER
Refill request for Aricept. Pt last seen 11/13/20 by Dr. Mayers and was due to be seen between 5/2020-11/2021. Pt is overdue for appt. Will send letter regarding this need. Will also send 30 day supply with zero refills.     Donita Dior RN on 1/24/2022 at 10:00 AM

## 2022-01-24 NOTE — LETTER
1/24/2022        RE: Obdulio Gordon  [address]  [place] [address]          Dear Mr. Gordon,         We recently provided you with medication refills.  Many medications require routine follow-up with your doctor. Unfortunately, Dr. Mayers no longer works within [facility], so you will need to establish care with another one of our providers.    Your prescription(s) have been refilled for 30 days so you may have time for the above noted follow-up. Please call to schedule soon so we can assure you have an appointment before your next refills are needed. If you have already made a follow up appointment, please disregard this letter.         Sincerely,      [facility]     (Formerly known as [facility])

## 2022-02-19 DIAGNOSIS — R41.3 MEMORY DIFFICULTIES: ICD-10-CM

## 2022-02-21 RX ORDER — DONEPEZIL HYDROCHLORIDE 5 MG/1
TABLET, FILM COATED ORAL
Qty: 30 TABLET | Refills: 0 | Status: SHIPPED | OUTPATIENT
Start: 2022-02-21 | End: 2022-03-21

## 2022-02-21 NOTE — TELEPHONE ENCOUNTER
Refill request for Aricept. Pt last seen 11/13/20 by Dr. Mayers. Pt has been sent letters regarding need for follow up, however, he does have [employer] insurance so he will need to find another neurologist. Will send in 14 day supply with zero refills.     Donita Dior RN on 2/21/2022 at 10:07 AM

## 2022-03-19 DIAGNOSIS — R41.3 MEMORY DIFFICULTIES: ICD-10-CM

## 2022-03-21 RX ORDER — DONEPEZIL HYDROCHLORIDE 5 MG/1
TABLET, FILM COATED ORAL
Qty: 15 TABLET | Refills: 0 | Status: SHIPPED | OUTPATIENT
Start: 2022-03-21 | End: 2022-04-01

## 2022-03-21 NOTE — TELEPHONE ENCOUNTER
Last seen 3/13/20 w/ Dr Mayers, has no upcoming appt. Teodoro Pack MA on 3/21/2022 at 7:54 AM  Medication T'd for review and signature

## 2022-04-01 DIAGNOSIS — R41.3 MEMORY DIFFICULTIES: ICD-10-CM

## 2022-04-01 RX ORDER — DONEPEZIL HYDROCHLORIDE 5 MG/1
TABLET, FILM COATED ORAL
Qty: 15 TABLET | Refills: 0 | Status: SHIPPED | OUTPATIENT
Start: 2022-04-01 | End: 2022-04-15

## 2022-04-01 NOTE — LETTER
4/1/2022        RE: Obdulio Gordon  [address]  [place] [address]      We recently provided you with medication refills.  Many medications require routine follow-up with your doctor.    Your prescription(s) have been refilled for 30 days so you may have time for the above noted follow-up. Please call to schedule soon so we can assure you have an appointment before your next refills are needed. If you have already made a follow up appointment, please disregard this letter.           Sincerely,        [facility]     (Formerly known as [facility])

## 2022-04-01 NOTE — TELEPHONE ENCOUNTER
Pt was last seen 3/20 with no upcomming appt. Letter sent.  Teodoro Pack MA on 4/1/2022 at 7:42 AM  Medication T'd for review and signature

## 2022-04-15 DIAGNOSIS — R41.3 MEMORY DIFFICULTIES: ICD-10-CM

## 2022-04-15 RX ORDER — DONEPEZIL HYDROCHLORIDE 5 MG/1
TABLET, FILM COATED ORAL
Qty: 7 TABLET | Refills: 0 | Status: SHIPPED | OUTPATIENT
Start: 2022-04-15

## 2022-04-15 NOTE — TELEPHONE ENCOUNTER
Pt still has not scheduled follow up appt. Will send in 7 day supply of Aricept with zero refills.     Donita Dior RN on 4/15/2022 at 11:41 AM

## 2022-04-18 NOTE — TELEPHONE ENCOUNTER
LVM for pt to schedule follow up appt @ [place] [facility]. However, if pt have [employer], [facility] is not in network.

## 2022-06-30 ENCOUNTER — LAB REQUISITION (OUTPATIENT)
Dept: LAB | Facility: CLINIC | Age: 87
End: 2022-06-30
Payer: COMMERCIAL

## 2022-06-30 DIAGNOSIS — E03.9 HYPOTHYROIDISM, UNSPECIFIED: ICD-10-CM

## 2022-06-30 DIAGNOSIS — E78.2 MIXED HYPERLIPIDEMIA: ICD-10-CM

## 2022-06-30 LAB
ALBUMIN SERPL BCG-MCNC: 3.9 G/DL (ref 3.5–5.2)
ALP SERPL-CCNC: 72 U/L (ref 40–129)
ALT SERPL W P-5'-P-CCNC: 12 U/L (ref 10–50)
ANION GAP SERPL CALCULATED.3IONS-SCNC: 6 MMOL/L (ref 7–15)
AST SERPL W P-5'-P-CCNC: 18 U/L (ref 10–50)
BILIRUB SERPL-MCNC: 0.5 MG/DL
BUN SERPL-MCNC: 15.6 MG/DL (ref 8–23)
CALCIUM SERPL-MCNC: 8.7 MG/DL (ref 8.2–9.6)
CHLORIDE SERPL-SCNC: 102 MMOL/L (ref 98–107)
CHOLEST SERPL-MCNC: 198 MG/DL
CREAT SERPL-MCNC: 1.4 MG/DL (ref 0.67–1.17)
DEPRECATED HCO3 PLAS-SCNC: 29 MMOL/L (ref 22–29)
GFR SERPL CREATININE-BSD FRML MDRD: 48 ML/MIN/1.73M2
GLUCOSE SERPL-MCNC: 88 MG/DL (ref 70–99)
HDLC SERPL-MCNC: 35 MG/DL
LDLC SERPL CALC-MCNC: 137 MG/DL
NONHDLC SERPL-MCNC: 163 MG/DL
POTASSIUM SERPL-SCNC: 4.3 MMOL/L (ref 3.4–5.3)
PROT SERPL-MCNC: 6.9 G/DL (ref 6.4–8.3)
SODIUM SERPL-SCNC: 137 MMOL/L (ref 136–145)
TRIGL SERPL-MCNC: 129 MG/DL
TSH SERPL DL<=0.005 MIU/L-ACNC: 5.01 UIU/ML (ref 0.3–4.2)

## 2022-06-30 PROCEDURE — 84443 ASSAY THYROID STIM HORMONE: CPT | Mod: ORL | Performed by: FAMILY MEDICINE

## 2022-06-30 PROCEDURE — 80053 COMPREHEN METABOLIC PANEL: CPT | Mod: ORL | Performed by: FAMILY MEDICINE

## 2022-06-30 PROCEDURE — 80061 LIPID PANEL: CPT | Mod: ORL | Performed by: FAMILY MEDICINE

## 2023-06-15 ENCOUNTER — PATIENT OUTREACH (OUTPATIENT)
Dept: CARE COORDINATION | Facility: CLINIC | Age: 88
End: 2023-06-15
Payer: COMMERCIAL

## 2023-06-15 NOTE — PROGRESS NOTES
Clinic Care Coordination Contact  Care Team Conversations    Patient identified for care management outreach, however patient is not on a value based contract so cannot complete outreach. Will escalate to clinic staff if specific needs or resources are indicated.    [facility] CC consulted with the pt's care team and pt/pt's son, assisted letter for memory care agency, and with faxing of information for patient.     Karla Beltran, [facility]  Social Work Care Coordinator - [facility]  Care Coordination  [email or web address]  Cell Phone: [phone]  Gender pronouns: she/her  Employed by [facility] [employer]

## 2023-07-07 ENCOUNTER — LAB REQUISITION (OUTPATIENT)
Dept: LAB | Facility: CLINIC | Age: 88
End: 2023-07-07
Payer: COMMERCIAL

## 2023-07-07 DIAGNOSIS — E78.2 MIXED HYPERLIPIDEMIA: ICD-10-CM

## 2023-07-07 DIAGNOSIS — E03.9 HYPOTHYROIDISM, UNSPECIFIED: ICD-10-CM

## 2023-07-07 PROCEDURE — 80053 COMPREHEN METABOLIC PANEL: CPT | Mod: ORL | Performed by: FAMILY MEDICINE

## 2023-07-07 PROCEDURE — 80061 LIPID PANEL: CPT | Mod: ORL | Performed by: FAMILY MEDICINE

## 2023-07-07 PROCEDURE — 84443 ASSAY THYROID STIM HORMONE: CPT | Mod: ORL | Performed by: FAMILY MEDICINE

## 2023-07-08 LAB
ALBUMIN SERPL BCG-MCNC: 3.4 G/DL (ref 3.5–5.2)
ALP SERPL-CCNC: 69 U/L (ref 40–129)
ALT SERPL W P-5'-P-CCNC: 27 U/L (ref 0–70)
ANION GAP SERPL CALCULATED.3IONS-SCNC: 12 MMOL/L (ref 7–15)
AST SERPL W P-5'-P-CCNC: 30 U/L (ref 0–45)
BILIRUB SERPL-MCNC: 0.4 MG/DL
BUN SERPL-MCNC: 13.9 MG/DL (ref 8–23)
CALCIUM SERPL-MCNC: 8.8 MG/DL (ref 8.2–9.6)
CHLORIDE SERPL-SCNC: 102 MMOL/L (ref 98–107)
CHOLEST SERPL-MCNC: 176 MG/DL
CREAT SERPL-MCNC: 1.35 MG/DL (ref 0.67–1.17)
DEPRECATED HCO3 PLAS-SCNC: 26 MMOL/L (ref 22–29)
GFR SERPL CREATININE-BSD FRML MDRD: 50 ML/MIN/1.73M2
GLUCOSE SERPL-MCNC: 89 MG/DL (ref 70–99)
HDLC SERPL-MCNC: 47 MG/DL
LDLC SERPL CALC-MCNC: 111 MG/DL
NONHDLC SERPL-MCNC: 129 MG/DL
POTASSIUM SERPL-SCNC: 4.1 MMOL/L (ref 3.4–5.3)
PROT SERPL-MCNC: 6.1 G/DL (ref 6.4–8.3)
SODIUM SERPL-SCNC: 140 MMOL/L (ref 136–145)
TRIGL SERPL-MCNC: 91 MG/DL
TSH SERPL DL<=0.005 MIU/L-ACNC: 4.04 UIU/ML (ref 0.3–4.2)

## 2023-09-20 ENCOUNTER — LAB REQUISITION (OUTPATIENT)
Dept: LAB | Facility: CLINIC | Age: 88
End: 2023-09-20
Payer: COMMERCIAL

## 2023-09-20 PROCEDURE — 87635 SARS-COV-2 COVID-19 AMP PRB: CPT | Mod: ORL

## 2023-09-21 LAB — SARS-COV-2 RNA RESP QL NAA+PROBE: POSITIVE

## 2023-10-18 ENCOUNTER — LAB REQUISITION (OUTPATIENT)
Dept: LAB | Facility: CLINIC | Age: 88
End: 2023-10-18
Payer: COMMERCIAL

## 2023-10-18 DIAGNOSIS — R41.0 DISORIENTATION, UNSPECIFIED: ICD-10-CM

## 2023-10-18 LAB
ALBUMIN UR-MCNC: 50 MG/DL
APPEARANCE UR: ABNORMAL
BILIRUB UR QL STRIP: NEGATIVE
CAOX CRY #/AREA URNS HPF: ABNORMAL /HPF
COLOR UR AUTO: YELLOW
GLUCOSE UR STRIP-MCNC: NEGATIVE MG/DL
HGB UR QL STRIP: NEGATIVE
KETONES UR STRIP-MCNC: NEGATIVE MG/DL
LEUKOCYTE ESTERASE UR QL STRIP: ABNORMAL
NITRATE UR QL: NEGATIVE
PH UR STRIP: 6 [PH] (ref 5–7)
RBC URINE: 15 /HPF
SP GR UR STRIP: 1.02 (ref 1–1.03)
UROBILINOGEN UR STRIP-MCNC: NORMAL MG/DL
WBC URINE: >182 /HPF

## 2023-10-18 PROCEDURE — 87088 URINE BACTERIA CULTURE: CPT | Mod: ORL

## 2023-10-18 PROCEDURE — 81001 URINALYSIS AUTO W/SCOPE: CPT | Mod: ORL

## 2023-10-21 LAB — BACTERIA UR CULT: ABNORMAL

## 2024-02-20 ENCOUNTER — LAB REQUISITION (OUTPATIENT)
Dept: LAB | Facility: CLINIC | Age: 89
End: 2024-02-20
Payer: COMMERCIAL

## 2024-02-20 DIAGNOSIS — E03.9 HYPOTHYROIDISM, UNSPECIFIED: ICD-10-CM

## 2024-02-21 LAB — TSH SERPL DL<=0.005 MIU/L-ACNC: 0.17 UIU/ML (ref 0.3–4.2)

## 2024-02-21 PROCEDURE — P9604 ONE-WAY ALLOW PRORATED TRIP: HCPCS | Mod: ORL | Performed by: NURSE PRACTITIONER

## 2024-02-21 PROCEDURE — 84443 ASSAY THYROID STIM HORMONE: CPT | Mod: ORL | Performed by: NURSE PRACTITIONER

## 2024-02-21 PROCEDURE — 36415 COLL VENOUS BLD VENIPUNCTURE: CPT | Mod: ORL | Performed by: NURSE PRACTITIONER

## 2024-03-12 ENCOUNTER — LAB REQUISITION (OUTPATIENT)
Dept: LAB | Facility: CLINIC | Age: 89
End: 2024-03-12
Payer: COMMERCIAL

## 2024-03-12 DIAGNOSIS — N40.0 BENIGN PROSTATIC HYPERPLASIA WITHOUT LOWER URINARY TRACT SYMPTOMS: ICD-10-CM

## 2024-03-12 DIAGNOSIS — F30.9 MANIC EPISODE, UNSPECIFIED (H): ICD-10-CM

## 2024-03-13 LAB
ANION GAP SERPL CALCULATED.3IONS-SCNC: 10 MMOL/L (ref 7–15)
BUN SERPL-MCNC: 13.3 MG/DL (ref 8–23)
CALCIUM SERPL-MCNC: 8.8 MG/DL (ref 8.2–9.6)
CHLORIDE SERPL-SCNC: 103 MMOL/L (ref 98–107)
CREAT SERPL-MCNC: 1.49 MG/DL (ref 0.67–1.17)
DEPRECATED HCO3 PLAS-SCNC: 24 MMOL/L (ref 22–29)
EGFRCR SERPLBLD CKD-EPI 2021: 44 ML/MIN/1.73M2
ERYTHROCYTE [DISTWIDTH] IN BLOOD BY AUTOMATED COUNT: 14 % (ref 10–15)
GLUCOSE SERPL-MCNC: 123 MG/DL (ref 70–99)
HCT VFR BLD AUTO: 40.5 % (ref 40–53)
HGB BLD-MCNC: 12.7 G/DL (ref 13.3–17.7)
MCH RBC QN AUTO: 29.3 PG (ref 26.5–33)
MCHC RBC AUTO-ENTMCNC: 31.4 G/DL (ref 31.5–36.5)
MCV RBC AUTO: 94 FL (ref 78–100)
PLATELET # BLD AUTO: 267 10E3/UL (ref 150–450)
POTASSIUM SERPL-SCNC: 4 MMOL/L (ref 3.4–5.3)
RBC # BLD AUTO: 4.33 10E6/UL (ref 4.4–5.9)
SODIUM SERPL-SCNC: 137 MMOL/L (ref 135–145)
WBC # BLD AUTO: 7.1 10E3/UL (ref 4–11)

## 2024-03-13 PROCEDURE — 36415 COLL VENOUS BLD VENIPUNCTURE: CPT | Mod: ORL | Performed by: NURSE PRACTITIONER

## 2024-03-13 PROCEDURE — P9604 ONE-WAY ALLOW PRORATED TRIP: HCPCS | Mod: ORL | Performed by: NURSE PRACTITIONER

## 2024-03-13 PROCEDURE — 80048 BASIC METABOLIC PNL TOTAL CA: CPT | Mod: ORL | Performed by: NURSE PRACTITIONER

## 2024-03-13 PROCEDURE — 85027 COMPLETE CBC AUTOMATED: CPT | Mod: ORL | Performed by: NURSE PRACTITIONER

## 2024-05-14 ENCOUNTER — LAB REQUISITION (OUTPATIENT)
Dept: LAB | Facility: CLINIC | Age: 89
End: 2024-05-14
Payer: COMMERCIAL

## 2024-05-14 DIAGNOSIS — E05.90 THYROTOXICOSIS, UNSPECIFIED WITHOUT THYROTOXIC CRISIS OR STORM: ICD-10-CM

## 2024-05-15 LAB — TSH SERPL DL<=0.005 MIU/L-ACNC: 0.94 UIU/ML (ref 0.3–4.2)

## 2024-05-15 PROCEDURE — P9604 ONE-WAY ALLOW PRORATED TRIP: HCPCS | Mod: ORL | Performed by: NURSE PRACTITIONER

## 2024-05-15 PROCEDURE — 36415 COLL VENOUS BLD VENIPUNCTURE: CPT | Mod: ORL | Performed by: NURSE PRACTITIONER

## 2024-05-15 PROCEDURE — 84443 ASSAY THYROID STIM HORMONE: CPT | Mod: ORL | Performed by: NURSE PRACTITIONER

## 2024-07-09 ENCOUNTER — LAB REQUISITION (OUTPATIENT)
Dept: LAB | Facility: CLINIC | Age: 89
End: 2024-07-09
Payer: COMMERCIAL

## 2024-07-09 DIAGNOSIS — E03.9 HYPOTHYROIDISM, UNSPECIFIED: ICD-10-CM

## 2024-07-09 DIAGNOSIS — R63.4 ABNORMAL WEIGHT LOSS: ICD-10-CM

## 2024-07-09 DIAGNOSIS — G30.1 ALZHEIMER'S DISEASE WITH LATE ONSET (CODE) (H): ICD-10-CM

## 2024-07-10 LAB
ERYTHROCYTE [DISTWIDTH] IN BLOOD BY AUTOMATED COUNT: 14.6 % (ref 10–15)
HCT VFR BLD AUTO: 41.4 % (ref 40–53)
HGB BLD-MCNC: 13.2 G/DL (ref 13.3–17.7)
MCH RBC QN AUTO: 30.3 PG (ref 26.5–33)
MCHC RBC AUTO-ENTMCNC: 31.9 G/DL (ref 31.5–36.5)
MCV RBC AUTO: 95 FL (ref 78–100)
PLATELET # BLD AUTO: 262 10E3/UL (ref 150–450)
RBC # BLD AUTO: 4.35 10E6/UL (ref 4.4–5.9)
WBC # BLD AUTO: 6.5 10E3/UL (ref 4–11)

## 2024-07-10 PROCEDURE — 82306 VITAMIN D 25 HYDROXY: CPT | Mod: ORL | Performed by: NURSE PRACTITIONER

## 2024-07-10 PROCEDURE — 82607 VITAMIN B-12: CPT | Mod: ORL | Performed by: NURSE PRACTITIONER

## 2024-07-10 PROCEDURE — 36415 COLL VENOUS BLD VENIPUNCTURE: CPT | Mod: ORL | Performed by: NURSE PRACTITIONER

## 2024-07-10 PROCEDURE — P9603 ONE-WAY ALLOW PRORATED MILES: HCPCS | Mod: ORL | Performed by: NURSE PRACTITIONER

## 2024-07-10 PROCEDURE — 80053 COMPREHEN METABOLIC PANEL: CPT | Mod: ORL | Performed by: NURSE PRACTITIONER

## 2024-07-10 PROCEDURE — 84443 ASSAY THYROID STIM HORMONE: CPT | Mod: ORL | Performed by: NURSE PRACTITIONER

## 2024-07-10 PROCEDURE — 85027 COMPLETE CBC AUTOMATED: CPT | Mod: ORL | Performed by: NURSE PRACTITIONER

## 2024-07-11 LAB
ALBUMIN SERPL BCG-MCNC: 3.8 G/DL (ref 3.5–5.2)
ALP SERPL-CCNC: 66 U/L (ref 40–150)
ALT SERPL W P-5'-P-CCNC: 14 U/L (ref 0–70)
ANION GAP SERPL CALCULATED.3IONS-SCNC: 11 MMOL/L (ref 7–15)
AST SERPL W P-5'-P-CCNC: 22 U/L (ref 0–45)
BILIRUB SERPL-MCNC: 0.3 MG/DL
BUN SERPL-MCNC: 14.2 MG/DL (ref 8–23)
CALCIUM SERPL-MCNC: 8.6 MG/DL (ref 8.2–9.6)
CHLORIDE SERPL-SCNC: 102 MMOL/L (ref 98–107)
CREAT SERPL-MCNC: 1.35 MG/DL (ref 0.67–1.17)
DEPRECATED HCO3 PLAS-SCNC: 26 MMOL/L (ref 22–29)
EGFRCR SERPLBLD CKD-EPI 2021: 49 ML/MIN/1.73M2
GLUCOSE SERPL-MCNC: 85 MG/DL (ref 70–99)
POTASSIUM SERPL-SCNC: 4 MMOL/L (ref 3.4–5.3)
PROT SERPL-MCNC: 6.9 G/DL (ref 6.4–8.3)
SODIUM SERPL-SCNC: 139 MMOL/L (ref 135–145)
TSH SERPL DL<=0.005 MIU/L-ACNC: 0.67 UIU/ML (ref 0.3–4.2)
VIT B12 SERPL-MCNC: 579 PG/ML (ref 232–1245)
VIT D+METAB SERPL-MCNC: 33 NG/ML (ref 20–50)

## 2024-08-05 ENCOUNTER — LAB REQUISITION (OUTPATIENT)
Dept: LAB | Facility: CLINIC | Age: 89
End: 2024-08-05
Payer: COMMERCIAL

## 2024-08-05 DIAGNOSIS — G30.1 ALZHEIMER'S DISEASE WITH LATE ONSET (CODE) (H): ICD-10-CM

## 2024-08-05 DIAGNOSIS — R63.4 ABNORMAL WEIGHT LOSS: ICD-10-CM

## 2024-08-05 DIAGNOSIS — E03.9 HYPOTHYROIDISM, UNSPECIFIED: ICD-10-CM

## 2024-08-05 DIAGNOSIS — F33.0 MAJOR DEPRESSIVE DISORDER, RECURRENT, MILD (H): ICD-10-CM

## 2024-08-07 LAB
ALBUMIN SERPL BCG-MCNC: 3.4 G/DL (ref 3.5–5.2)
ALP SERPL-CCNC: 64 U/L (ref 40–150)
ALT SERPL W P-5'-P-CCNC: <5 U/L (ref 0–70)
ANION GAP SERPL CALCULATED.3IONS-SCNC: 8 MMOL/L (ref 7–15)
AST SERPL W P-5'-P-CCNC: 23 U/L (ref 0–45)
BILIRUB SERPL-MCNC: 0.4 MG/DL
BUN SERPL-MCNC: 12.4 MG/DL (ref 8–23)
CALCIUM SERPL-MCNC: 8.4 MG/DL (ref 8.8–10.4)
CHLORIDE SERPL-SCNC: 104 MMOL/L (ref 98–107)
CREAT SERPL-MCNC: 1.34 MG/DL (ref 0.67–1.17)
EGFRCR SERPLBLD CKD-EPI 2021: 49 ML/MIN/1.73M2
ERYTHROCYTE [DISTWIDTH] IN BLOOD BY AUTOMATED COUNT: 13.9 % (ref 10–15)
GLUCOSE SERPL-MCNC: 80 MG/DL (ref 70–99)
HCO3 SERPL-SCNC: 25 MMOL/L (ref 22–29)
HCT VFR BLD AUTO: 39.7 % (ref 40–53)
HGB BLD-MCNC: 12.5 G/DL (ref 13.3–17.7)
MCH RBC QN AUTO: 30.3 PG (ref 26.5–33)
MCHC RBC AUTO-ENTMCNC: 31.5 G/DL (ref 31.5–36.5)
MCV RBC AUTO: 96 FL (ref 78–100)
PLATELET # BLD AUTO: 256 10E3/UL (ref 150–450)
POTASSIUM SERPL-SCNC: 4.1 MMOL/L (ref 3.4–5.3)
PROT SERPL-MCNC: 6.3 G/DL (ref 6.4–8.3)
RBC # BLD AUTO: 4.13 10E6/UL (ref 4.4–5.9)
SODIUM SERPL-SCNC: 137 MMOL/L (ref 135–145)
TSH SERPL DL<=0.005 MIU/L-ACNC: 0.57 UIU/ML (ref 0.3–4.2)
VIT B12 SERPL-MCNC: 516 PG/ML (ref 232–1245)
VIT D+METAB SERPL-MCNC: 27 NG/ML (ref 20–50)
WBC # BLD AUTO: 7.2 10E3/UL (ref 4–11)

## 2024-08-07 PROCEDURE — 82306 VITAMIN D 25 HYDROXY: CPT | Mod: ORL | Performed by: NURSE PRACTITIONER

## 2024-08-07 PROCEDURE — 80053 COMPREHEN METABOLIC PANEL: CPT | Mod: ORL | Performed by: NURSE PRACTITIONER

## 2024-08-07 PROCEDURE — 82607 VITAMIN B-12: CPT | Mod: ORL | Performed by: NURSE PRACTITIONER

## 2024-08-07 PROCEDURE — 36415 COLL VENOUS BLD VENIPUNCTURE: CPT | Mod: ORL | Performed by: NURSE PRACTITIONER

## 2024-08-07 PROCEDURE — 85027 COMPLETE CBC AUTOMATED: CPT | Mod: ORL | Performed by: NURSE PRACTITIONER

## 2024-08-07 PROCEDURE — 84443 ASSAY THYROID STIM HORMONE: CPT | Mod: ORL | Performed by: NURSE PRACTITIONER

## 2024-08-07 PROCEDURE — P9604 ONE-WAY ALLOW PRORATED TRIP: HCPCS | Mod: ORL | Performed by: NURSE PRACTITIONER

## 2025-02-20 ENCOUNTER — LAB REQUISITION (OUTPATIENT)
Dept: LAB | Facility: CLINIC | Age: OVER 89
End: 2025-02-20
Payer: COMMERCIAL

## 2025-02-20 DIAGNOSIS — N18.31 CHRONIC KIDNEY DISEASE, STAGE 3A (H): ICD-10-CM

## 2025-02-20 DIAGNOSIS — E03.9 HYPOTHYROIDISM, UNSPECIFIED: ICD-10-CM

## 2025-02-20 DIAGNOSIS — F33.0 MAJOR DEPRESSIVE DISORDER, RECURRENT, MILD: ICD-10-CM

## 2025-02-20 DIAGNOSIS — G30.1 ALZHEIMER'S DISEASE WITH LATE ONSET (CODE) (H): ICD-10-CM

## 2025-02-20 DIAGNOSIS — N40.0 BENIGN PROSTATIC HYPERPLASIA WITHOUT LOWER URINARY TRACT SYMPTOMS: ICD-10-CM

## 2025-02-26 LAB
ANION GAP SERPL CALCULATED.3IONS-SCNC: 10 MMOL/L (ref 7–15)
BUN SERPL-MCNC: 15.6 MG/DL (ref 8–23)
CALCIUM SERPL-MCNC: 8.5 MG/DL (ref 8.8–10.4)
CHLORIDE SERPL-SCNC: 104 MMOL/L (ref 98–107)
CREAT SERPL-MCNC: 1.35 MG/DL (ref 0.67–1.17)
EGFRCR SERPLBLD CKD-EPI 2021: 49 ML/MIN/1.73M2
ERYTHROCYTE [DISTWIDTH] IN BLOOD BY AUTOMATED COUNT: 14.2 % (ref 10–15)
GLUCOSE SERPL-MCNC: 141 MG/DL (ref 70–99)
HCO3 SERPL-SCNC: 23 MMOL/L (ref 22–29)
HCT VFR BLD AUTO: 38.8 % (ref 40–53)
HGB BLD-MCNC: 12.4 G/DL (ref 13.3–17.7)
MCH RBC QN AUTO: 29.3 PG (ref 26.5–33)
MCHC RBC AUTO-ENTMCNC: 32 G/DL (ref 31.5–36.5)
MCV RBC AUTO: 92 FL (ref 78–100)
PLATELET # BLD AUTO: 251 10E3/UL (ref 150–450)
POTASSIUM SERPL-SCNC: 4.1 MMOL/L (ref 3.4–5.3)
RBC # BLD AUTO: 4.23 10E6/UL (ref 4.4–5.9)
SODIUM SERPL-SCNC: 137 MMOL/L (ref 135–145)
TSH SERPL DL<=0.005 MIU/L-ACNC: 0.24 UIU/ML (ref 0.3–4.2)
WBC # BLD AUTO: 6.2 10E3/UL (ref 4–11)

## 2025-02-26 PROCEDURE — 85027 COMPLETE CBC AUTOMATED: CPT | Mod: ORL | Performed by: PHYSICIAN ASSISTANT

## 2025-02-26 PROCEDURE — 80048 BASIC METABOLIC PNL TOTAL CA: CPT | Mod: ORL | Performed by: PHYSICIAN ASSISTANT

## 2025-02-26 PROCEDURE — 84443 ASSAY THYROID STIM HORMONE: CPT | Mod: ORL | Performed by: PHYSICIAN ASSISTANT

## 2025-02-26 PROCEDURE — P9603 ONE-WAY ALLOW PRORATED MILES: HCPCS | Mod: ORL | Performed by: PHYSICIAN ASSISTANT

## 2025-02-26 PROCEDURE — 36415 COLL VENOUS BLD VENIPUNCTURE: CPT | Mod: ORL | Performed by: PHYSICIAN ASSISTANT

## 2025-07-02 ENCOUNTER — LAB REQUISITION (OUTPATIENT)
Dept: LAB | Facility: CLINIC | Age: OVER 89
End: 2025-07-02
Payer: COMMERCIAL

## 2025-07-02 DIAGNOSIS — N18.31 CHRONIC KIDNEY DISEASE, STAGE 3A (H): ICD-10-CM

## 2025-07-02 DIAGNOSIS — E03.9 HYPOTHYROIDISM, UNSPECIFIED: ICD-10-CM

## 2025-07-02 DIAGNOSIS — R63.4 ABNORMAL WEIGHT LOSS: ICD-10-CM

## 2025-07-09 LAB
ALBUMIN SERPL BCG-MCNC: 3.7 G/DL (ref 3.5–5.2)
ALP SERPL-CCNC: 79 U/L (ref 40–150)
ALT SERPL W P-5'-P-CCNC: <5 U/L (ref 0–70)
ANION GAP SERPL CALCULATED.3IONS-SCNC: 11 MMOL/L (ref 7–15)
AST SERPL W P-5'-P-CCNC: 30 U/L (ref 0–45)
BILIRUB SERPL-MCNC: 0.6 MG/DL
BUN SERPL-MCNC: 15.3 MG/DL (ref 8–23)
CALCIUM SERPL-MCNC: 9.1 MG/DL (ref 8.8–10.4)
CHLORIDE SERPL-SCNC: 102 MMOL/L (ref 98–107)
CREAT SERPL-MCNC: 1.45 MG/DL (ref 0.67–1.17)
EGFRCR SERPLBLD CKD-EPI 2021: 45 ML/MIN/1.73M2
ERYTHROCYTE [DISTWIDTH] IN BLOOD BY AUTOMATED COUNT: 14.2 % (ref 10–15)
GLUCOSE SERPL-MCNC: 92 MG/DL (ref 70–99)
HCO3 SERPL-SCNC: 24 MMOL/L (ref 22–29)
HCT VFR BLD AUTO: 43.1 % (ref 40–53)
HGB BLD-MCNC: 13.7 G/DL (ref 13.3–17.7)
MCH RBC QN AUTO: 30 PG (ref 26.5–33)
MCHC RBC AUTO-ENTMCNC: 31.8 G/DL (ref 31.5–36.5)
MCV RBC AUTO: 94 FL (ref 78–100)
PLATELET # BLD AUTO: 254 10E3/UL (ref 150–450)
POTASSIUM SERPL-SCNC: 4 MMOL/L (ref 3.4–5.3)
PROT SERPL-MCNC: 7.2 G/DL (ref 6.4–8.3)
RBC # BLD AUTO: 4.57 10E6/UL (ref 4.4–5.9)
SODIUM SERPL-SCNC: 137 MMOL/L (ref 135–145)
T4 FREE SERPL-MCNC: 1.25 NG/DL (ref 0.9–1.7)
TSH SERPL DL<=0.005 MIU/L-ACNC: 7.97 UIU/ML (ref 0.3–4.2)
VIT B12 SERPL-MCNC: 603 PG/ML (ref 232–1245)
VIT D+METAB SERPL-MCNC: 39 NG/ML (ref 20–50)
WBC # BLD AUTO: 8 10E3/UL (ref 4–11)

## 2025-07-09 PROCEDURE — 82306 VITAMIN D 25 HYDROXY: CPT | Mod: ORL | Performed by: NURSE PRACTITIONER

## 2025-07-09 PROCEDURE — 36415 COLL VENOUS BLD VENIPUNCTURE: CPT | Mod: ORL | Performed by: NURSE PRACTITIONER

## 2025-07-09 PROCEDURE — 84443 ASSAY THYROID STIM HORMONE: CPT | Mod: ORL | Performed by: NURSE PRACTITIONER

## 2025-07-09 PROCEDURE — 82607 VITAMIN B-12: CPT | Mod: ORL | Performed by: NURSE PRACTITIONER

## 2025-07-09 PROCEDURE — 84439 ASSAY OF FREE THYROXINE: CPT | Mod: ORL | Performed by: NURSE PRACTITIONER

## 2025-07-09 PROCEDURE — P9604 ONE-WAY ALLOW PRORATED TRIP: HCPCS | Mod: ORL | Performed by: NURSE PRACTITIONER

## 2025-07-09 PROCEDURE — 85027 COMPLETE CBC AUTOMATED: CPT | Mod: ORL | Performed by: NURSE PRACTITIONER

## 2025-07-09 PROCEDURE — 80053 COMPREHEN METABOLIC PANEL: CPT | Mod: ORL | Performed by: NURSE PRACTITIONER
